# Patient Record
Sex: FEMALE | Race: BLACK OR AFRICAN AMERICAN | Employment: UNEMPLOYED | URBAN - METROPOLITAN AREA
[De-identification: names, ages, dates, MRNs, and addresses within clinical notes are randomized per-mention and may not be internally consistent; named-entity substitution may affect disease eponyms.]

---

## 2020-05-13 ENCOUNTER — TELEPHONE (OUTPATIENT)
Dept: INTERNAL MEDICINE CLINIC | Age: 45
End: 2020-05-13

## 2020-05-13 NOTE — TELEPHONE ENCOUNTER
----- Message from Terri Rodgers sent at 2020  3:00 PM EDT -----  Regarding: Dr. Tahira Oliver  Appointment not available    : 75      Caller's first and last name and relationship to patient (if not the patient): n/a  Best contact [phone]  Preferred date and time: Virtual visit as soon as possible  Scheduled appointment date and time: n/a  Reason for appointment: NP est pcp;   Details to clarify the request: Pt says Dr Karissa Pineda came highly recommended.

## 2020-05-14 NOTE — TELEPHONE ENCOUNTER
Reached out to patient to assist w/ scheduling a New Patient appointment.  Patient has been scheduled for 05/21 @ 10:20 - thank you

## 2020-05-21 ENCOUNTER — OFFICE VISIT (OUTPATIENT)
Dept: INTERNAL MEDICINE CLINIC | Age: 45
End: 2020-05-21

## 2020-05-21 VITALS
DIASTOLIC BLOOD PRESSURE: 80 MMHG | HEIGHT: 67 IN | TEMPERATURE: 98.8 F | HEART RATE: 74 BPM | SYSTOLIC BLOOD PRESSURE: 113 MMHG | BODY MASS INDEX: 19.59 KG/M2 | WEIGHT: 124.8 LBS | OXYGEN SATURATION: 99 % | RESPIRATION RATE: 12 BRPM

## 2020-05-21 DIAGNOSIS — A04.8 H. PYLORI INFECTION: ICD-10-CM

## 2020-05-21 DIAGNOSIS — R63.0 DECREASED APPETITE: Primary | ICD-10-CM

## 2020-05-21 DIAGNOSIS — R00.2 PALPITATIONS: ICD-10-CM

## 2020-05-21 DIAGNOSIS — F41.9 ANXIETY: ICD-10-CM

## 2020-05-21 PROBLEM — R74.8 ELEVATED LIVER ENZYMES: Status: ACTIVE | Noted: 2019-01-01

## 2020-05-21 PROBLEM — E28.319 EARLY MENOPAUSE: Status: ACTIVE | Noted: 2019-03-01

## 2020-05-21 RX ORDER — BUSPIRONE HYDROCHLORIDE 7.5 MG/1
7.5 TABLET ORAL 2 TIMES DAILY
COMMUNITY
End: 2020-08-20

## 2020-05-21 RX ORDER — OMEPRAZOLE 40 MG/1
40 CAPSULE, DELAYED RELEASE ORAL DAILY
Qty: 30 CAP | Refills: 0 | Status: SHIPPED | OUTPATIENT
Start: 2020-05-21 | End: 2020-06-20

## 2020-05-21 RX ORDER — SERTRALINE HYDROCHLORIDE 100 MG/1
TABLET, FILM COATED ORAL DAILY
COMMUNITY
End: 2020-09-24
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
## 2020-06-20 DIAGNOSIS — A04.8 H. PYLORI INFECTION: ICD-10-CM

## 2020-06-20 RX ORDER — OMEPRAZOLE 40 MG/1
CAPSULE, DELAYED RELEASE ORAL
Qty: 30 CAP | Refills: 0 | Status: SHIPPED | OUTPATIENT
Start: 2020-06-20 | End: 2020-07-20

## 2020-08-18 ENCOUNTER — APPOINTMENT (OUTPATIENT)
Dept: CT IMAGING | Age: 45
End: 2020-08-18
Attending: EMERGENCY MEDICINE
Payer: COMMERCIAL

## 2020-08-18 ENCOUNTER — HOSPITAL ENCOUNTER (EMERGENCY)
Age: 45
Discharge: HOME | End: 2020-08-19
Attending: EMERGENCY MEDICINE
Payer: COMMERCIAL

## 2020-08-18 ENCOUNTER — APPOINTMENT (OUTPATIENT)
Dept: GENERAL RADIOLOGY | Age: 45
End: 2020-08-18
Attending: EMERGENCY MEDICINE
Payer: COMMERCIAL

## 2020-08-18 DIAGNOSIS — R07.9 CHEST PAIN, UNSPECIFIED TYPE: Primary | ICD-10-CM

## 2020-08-18 LAB
ALBUMIN SERPL-MCNC: 4 G/DL (ref 3.5–5)
ALBUMIN/GLOB SERPL: 1.1 {RATIO} (ref 1.1–2.2)
ALP SERPL-CCNC: 92 U/L (ref 45–117)
ALT SERPL-CCNC: 52 U/L (ref 12–78)
ANION GAP SERPL CALC-SCNC: 9 MMOL/L (ref 5–15)
AST SERPL-CCNC: 21 U/L (ref 15–37)
BASOPHILS # BLD: 0.1 K/UL (ref 0–0.1)
BASOPHILS NFR BLD: 1 % (ref 0–1)
BILIRUB SERPL-MCNC: 0.3 MG/DL (ref 0.2–1)
BUN SERPL-MCNC: 13 MG/DL (ref 6–20)
BUN/CREAT SERPL: 14 (ref 12–20)
CALCIUM SERPL-MCNC: 9 MG/DL (ref 8.5–10.1)
CHLORIDE SERPL-SCNC: 106 MMOL/L (ref 97–108)
CO2 SERPL-SCNC: 25 MMOL/L (ref 21–32)
COMMENT, HOLDF: NORMAL
CREAT SERPL-MCNC: 0.96 MG/DL (ref 0.55–1.02)
D DIMER PPP FEU-MCNC: 1.07 MG/L FEU (ref 0–0.65)
DIFFERENTIAL METHOD BLD: NORMAL
EOSINOPHIL # BLD: 0.1 K/UL (ref 0–0.4)
EOSINOPHIL NFR BLD: 3 % (ref 0–7)
ERYTHROCYTE [DISTWIDTH] IN BLOOD BY AUTOMATED COUNT: 13 % (ref 11.5–14.5)
GLOBULIN SER CALC-MCNC: 3.5 G/DL (ref 2–4)
GLUCOSE SERPL-MCNC: 155 MG/DL (ref 65–100)
HCG UR QL: NEGATIVE
HCT VFR BLD AUTO: 35.3 % (ref 35–47)
HGB BLD-MCNC: 11.7 G/DL (ref 11.5–16)
IMM GRANULOCYTES # BLD AUTO: 0 K/UL (ref 0–0.04)
IMM GRANULOCYTES NFR BLD AUTO: 0 % (ref 0–0.5)
LYMPHOCYTES # BLD: 1.7 K/UL (ref 0.8–3.5)
LYMPHOCYTES NFR BLD: 34 % (ref 12–49)
MCH RBC QN AUTO: 29.8 PG (ref 26–34)
MCHC RBC AUTO-ENTMCNC: 33.1 G/DL (ref 30–36.5)
MCV RBC AUTO: 89.8 FL (ref 80–99)
MONOCYTES # BLD: 0.4 K/UL (ref 0–1)
MONOCYTES NFR BLD: 7 % (ref 5–13)
NEUTS SEG # BLD: 2.7 K/UL (ref 1.8–8)
NEUTS SEG NFR BLD: 55 % (ref 32–75)
NRBC # BLD: 0 K/UL (ref 0–0.01)
NRBC BLD-RTO: 0 PER 100 WBC
PLATELET # BLD AUTO: 209 K/UL (ref 150–400)
PMV BLD AUTO: 9.5 FL (ref 8.9–12.9)
POTASSIUM SERPL-SCNC: 3.2 MMOL/L (ref 3.5–5.1)
PROT SERPL-MCNC: 7.5 G/DL (ref 6.4–8.2)
RBC # BLD AUTO: 3.93 M/UL (ref 3.8–5.2)
SAMPLES BEING HELD,HOLD: NORMAL
SODIUM SERPL-SCNC: 140 MMOL/L (ref 136–145)
TROPONIN I SERPL-MCNC: <0.05 NG/ML
WBC # BLD AUTO: 4.9 K/UL (ref 3.6–11)

## 2020-08-18 PROCEDURE — 96374 THER/PROPH/DIAG INJ IV PUSH: CPT

## 2020-08-18 PROCEDURE — 36415 COLL VENOUS BLD VENIPUNCTURE: CPT

## 2020-08-18 PROCEDURE — 74011636320 HC RX REV CODE- 636/320: Performed by: RADIOLOGY

## 2020-08-18 PROCEDURE — 81025 URINE PREGNANCY TEST: CPT

## 2020-08-18 PROCEDURE — 85025 COMPLETE CBC W/AUTO DIFF WBC: CPT

## 2020-08-18 PROCEDURE — 71046 X-RAY EXAM CHEST 2 VIEWS: CPT

## 2020-08-18 PROCEDURE — 99284 EMERGENCY DEPT VISIT MOD MDM: CPT

## 2020-08-18 PROCEDURE — 85379 FIBRIN DEGRADATION QUANT: CPT

## 2020-08-18 PROCEDURE — 74011250636 HC RX REV CODE- 250/636: Performed by: EMERGENCY MEDICINE

## 2020-08-18 PROCEDURE — 71275 CT ANGIOGRAPHY CHEST: CPT

## 2020-08-18 PROCEDURE — 80053 COMPREHEN METABOLIC PANEL: CPT

## 2020-08-18 PROCEDURE — 84484 ASSAY OF TROPONIN QUANT: CPT

## 2020-08-18 PROCEDURE — 93005 ELECTROCARDIOGRAM TRACING: CPT

## 2020-08-18 RX ORDER — LORAZEPAM 2 MG/ML
1 INJECTION INTRAMUSCULAR ONCE
Status: COMPLETED | OUTPATIENT
Start: 2020-08-18 | End: 2020-08-18

## 2020-08-18 RX ADMIN — IOPAMIDOL 70 ML: 755 INJECTION, SOLUTION INTRAVENOUS at 23:17

## 2020-08-18 RX ADMIN — LORAZEPAM 1 MG: 2 INJECTION INTRAMUSCULAR; INTRAVENOUS at 22:52

## 2020-08-19 VITALS
TEMPERATURE: 98.9 F | SYSTOLIC BLOOD PRESSURE: 97 MMHG | HEART RATE: 67 BPM | HEIGHT: 67 IN | OXYGEN SATURATION: 100 % | RESPIRATION RATE: 16 BRPM | BODY MASS INDEX: 19.15 KG/M2 | WEIGHT: 122 LBS | DIASTOLIC BLOOD PRESSURE: 67 MMHG

## 2020-08-19 LAB
ATRIAL RATE: 92 BPM
CALCULATED P AXIS, ECG09: 77 DEGREES
CALCULATED R AXIS, ECG10: 78 DEGREES
CALCULATED T AXIS, ECG11: 60 DEGREES
DIAGNOSIS, 93000: NORMAL
P-R INTERVAL, ECG05: 178 MS
Q-T INTERVAL, ECG07: 366 MS
QRS DURATION, ECG06: 76 MS
QTC CALCULATION (BEZET), ECG08: 452 MS
VENTRICULAR RATE, ECG03: 92 BPM

## 2020-08-19 NOTE — ED NOTES
I have reviewed discharge instructions with the patient. The patient verbalized understanding. Patient ambulated out of the ED in stable condition with a steady gait.

## 2020-08-19 NOTE — ED TRIAGE NOTES
Pt refereed by Anda for elevated D-Dimer. c/o intermittent left sided chest pain that started today. Denies SOB, v/d. Reports some nausea. Denies taking any medications for pain.

## 2020-08-19 NOTE — ED PROVIDER NOTES
This is a 28-year-old female with a history of anxiety, depression and GERD that presents with a chief complaint of chest pain. She reports that she was driving her car earlier this evening when she developed some left-sided, sharp/burning chest pain. The pain was nonradiating. It was not associated with any shortness of breath but was associated with nausea. She denies any abdominal pain, GI or urinary symptoms. She has no personal history of DVT/PE; although, her mother has had a PE in the past.           Past Medical History:   Diagnosis Date    Anxiety     Dr. Erika Watson. started zoloft 2020, buspar 2020. .  consulted MD in [facility] and took xanax prn    Depression     Dr. Tobias Dick Early menopause 2019    age 37. dx in [place] Elevated liver enzymes 2019    resolved. consulted GI . US normal    H. pylori infection     dx by stool test.  Dr. Srinivas Stover. failed prevpak, then did not tolerate Pylera.  HSV (herpes simplex virus) infection     dx with hsv-1, hsv-2    Palpitations     benign tachycardia.  consulted EP/card in [place]  (echo normal) and [place] 2020 (48 hour holter ok)        Past Surgical History:   Procedure Laterality Date    HX  SECTION      HX OVARIAN CYST REMOVAL      has one remaining ovary         Family History:   Problem Relation Age of Onset    Hypertension Mother     Pulmonary Embolism Mother     Thyroid Disease Mother     High Cholesterol Mother     Schizophrenia Maternal Aunt     Colon Cancer Maternal Aunt     Heart Attack Maternal Grandmother        Social History     Socioeconomic History    Marital status: SINGLE     Spouse name: 0    Number of children: 1    Years of education: Not on file    Highest education level: Not on file   Occupational History    Not on file   Social Needs    Financial resource strain: Not on file    Food insecurity     Worry: Not on file     Inability: Not on file   [employer] needs Medical: Not on file     Non-medical: Not on file   Tobacco Use    Smoking status: Never Smoker    Smokeless tobacco: Never Used   Substance and Sexual Activity    Alcohol use: Never     Frequency: Never    Drug use: Never    Sexual activity: Not on file   Lifestyle    Physical activity     Days per week: Not on file     Minutes per session: Not on file    Stress: Not on file   Relationships    Social connections     Talks on phone: Not on file     Gets together: Not on file     Attends Jain service: Not on file     Active member of club or organization: Not on file     Attends meetings of clubs or organizations: Not on file     Relationship status: Not on file    Intimate partner violence     Fear of current or ex partner: Not on file     Emotionally abused: Not on file     Physically abused: Not on file     Forced sexual activity: Not on file   Other Topics Concern    Not on file   Social History Narrative    11 year -old son 5/2020         ALLERGIES: Patient has no known allergies. Review of Systems   Constitutional: Negative for fever. HENT: Negative for rhinorrhea. Respiratory: Negative for shortness of breath. Cardiovascular: Positive for chest pain. Gastrointestinal: Positive for nausea. Negative for abdominal pain. Genitourinary: Negative for dysuria. Musculoskeletal: Negative for back pain. Skin: Negative for wound. Neurological: Negative for headaches. Psychiatric/Behavioral: Negative for confusion. Vitals:    08/18/20 2105   BP: 113/76   Pulse: 93   Resp: 16   Temp: 99 °F (37.2 °C)   SpO2: 100%   Weight: 55.3 kg (122 lb)   Height: 5' 7\" (1.702 m)            Physical Exam  Vitals signs and nursing note reviewed. Constitutional:       General: She is not in acute distress. Appearance: Normal appearance. She is well-developed. She is not ill-appearing, toxic-appearing or diaphoretic. HENT:      Head: Normocephalic and atraumatic.    Eyes:      Extraocular Movements: Extraocular movements intact. Neck:      Musculoskeletal: Normal range of motion. Cardiovascular:      Rate and Rhythm: Normal rate and regular rhythm. Pulses: Normal pulses. Heart sounds: Normal heart sounds. Pulmonary:      Effort: Pulmonary effort is normal. No respiratory distress. Breath sounds: Normal breath sounds. No wheezing. Abdominal:      General: There is no distension. Musculoskeletal: Normal range of motion. Skin:     General: Skin is warm and dry. Neurological:      Mental Status: She is alert and oriented to person, place, and time. Psychiatric:         Mood and Affect: Mood normal.          MDM  Number of Diagnoses or Management Options  Diagnosis management comments: Patient seen evaluated by myself. She presents with chest pain. Differentials include but not limited to pulmonary embolism, pneumonia, ACS, GERD among others. Laboratory studies show elevated d-dimer. CT of the chest will be obtained to rule out PE.  EKG shows normal sinus rhythm at a rate of 92, normal intervals, normal axis, no evidence of active ischemia. The remainder of her laboratory studies are unremarkable. 11:00 PM  Change of shift. Care of patient signed over to Dr. Candi Husain. Bedside handoff complete. Awaiting CTA. ED Course as of Nov 04 0812   Tue Aug 18, 2020   8688 11:06 PM  Change of shift. Care of patient taken over from Dr. Erika Crews; H&P reviewed, bedside handoff complete. Awaiting CTA chest      [ZD]   9991 IMPRESSION  IMPRESSION: No evidence for pulmonary embolism.    CTA CHEST W OR W WO CONT [ZD]      ED Course User Index  [ZD] Aaron Ramírez MD       Procedures

## 2020-08-20 ENCOUNTER — OFFICE VISIT (OUTPATIENT)
Dept: INTERNAL MEDICINE CLINIC | Age: 45
End: 2020-08-20
Payer: COMMERCIAL

## 2020-08-20 VITALS
SYSTOLIC BLOOD PRESSURE: 108 MMHG | DIASTOLIC BLOOD PRESSURE: 71 MMHG | WEIGHT: 122.2 LBS | BODY MASS INDEX: 19.18 KG/M2 | HEIGHT: 67 IN | TEMPERATURE: 99 F | HEART RATE: 74 BPM | RESPIRATION RATE: 12 BRPM | OXYGEN SATURATION: 100 %

## 2020-08-20 DIAGNOSIS — R79.89 ABNORMAL LFTS: ICD-10-CM

## 2020-08-20 DIAGNOSIS — Z12.9 CANCER SCREENING: ICD-10-CM

## 2020-08-20 DIAGNOSIS — Z00.00 ROUTINE ADULT HEALTH MAINTENANCE: ICD-10-CM

## 2020-08-20 DIAGNOSIS — R63.4 WEIGHT LOSS: ICD-10-CM

## 2020-08-20 DIAGNOSIS — R79.89 POSITIVE D DIMER: Primary | ICD-10-CM

## 2020-08-20 PROCEDURE — 99214 OFFICE O/P EST MOD 30 MIN: CPT | Performed by: INTERNAL MEDICINE

## 2020-08-20 RX ORDER — BUSPIRONE HYDROCHLORIDE 15 MG/1
15 TABLET ORAL 2 TIMES DAILY
COMMUNITY
Start: 2020-05-28 | End: 2020-11-27 | Stop reason: SDUPTHER

## 2020-08-20 RX ORDER — LORAZEPAM 0.5 MG/1
TABLET ORAL
Qty: 1 TAB | Refills: 0 | Status: SHIPPED | OUTPATIENT
Start: 2020-08-20 | End: 2021-04-02 | Stop reason: ALTCHOICE

## 2020-08-20 NOTE — PROGRESS NOTES
Assessment and Plan   Diagnoses and all orders for this visit:    1. Positive D dimer  -     PERIPHERAL SMEAR; Future  -     D DIMER; Future  -     PROTHROMBIN TIME + INR; Future  -     PTT; Future  -     SED RATE (ESR); Future  -     C REACTIVE PROTEIN, QT; Future  2. Weight loss  -     TSH 3RD GENERATION; Future  -     T4, FREE; Future  3. Abnormal LFTs  -     HEPATITIS C AB; Future  -     HBV CORE AB, IGG/IGM; Future  -     HEP B SURFACE AG; Future  -     HEP B SURFACE AB; Future  -     FERRITIN; Future  -     IRON PROFILE; Future  -     ALPHA-1-ANTITRYPSIN, TOTAL; Future  -     METABOLIC PANEL, COMPREHENSIVE; Future  4. Cancer screening  -     CT ABD PELV W WO CONT; Future  -     LORazepam (ATIVAN) 0.5 mg tablet; Take 1 tablet 30 min prior to procedure by mouth once  5. Routine adult health maintenance  -     LIPID PANEL; Future  -     HEMOGLOBIN A1C WITH EAG; Future  Presents to clinic for evaluation of a left chest sharp sensation who was noted to have a positive d-dimer in the emergency room and a negative CTA chest associated with weight loss and a slightly abnormal ALT on labs. Unclear etiology of symptoms and positive d-dimer. Had her colonoscopy and EGD 2 months ago so I do not think that would affect her d-dimer. Colonoscopy negative for cancer. Due for Pap smear, advised to make an appointment to get that done. We will get labs to further evaluate and decide next steps. Benefits, risks, possible drug interactions, and side effects of all new medications were reviewed with the patient. Pt verbalized understanding. Return to clinic: Pending work-up    Washington Malcolm MD  [facility]  8/20/2020    No future appointments. Subjective   Chief Complaint   ER follow-up    Louis Burnham is a 39 y.o. female     Was seen in the emergency room August 18 for left-sided chest \"sensation\" that feels like a \"kick start. \"  Also characterized as warm.   Reports that she was driving home from work when she experienced 4 episodes that lasted for a few seconds of a sharp sensation in her left chest not associated with lightheadedness, dizziness, shortness of breath. Her troponin and EKG were normal but her d-dimer was elevated. CT a chest was negative for PE. She returns to clinic today concerned about her elevated d-dimer. She has not had any of the chest sensation since her ER visit. Reports that she has been losing weight without really trying although she does report her depression got worse and she was not eating as much them. However, she feels she is eating a lot now and still not gaining any weight. She is concerned that there is something more serious going on. Has gone through menopause. Rheumatology review of systems significant for dry mouth, occasionally feeling like her food gets stuck when swallowing. Denies any joint pain or swelling, oral ulcers, dry eyes, rashes, skin tightening. Denies fever, chills, night sweats. Review of systems otherwise positive for left arm numbness from her elbow down that she thought was because she slept on her arm. Will also occasionally have itching in her  Toes and fingers. Of note, she had a EGD and colonoscopy in June that showed esophagitis and she is on a PPI. Review of Systems   Constitutional: Negative for chills and fever. Respiratory: Negative for shortness of breath. Cardiovascular: Positive for chest pain. Objective   Vitals:       Visit Vitals  /71 (BP 1 Location: Left arm, BP Patient Position: Sitting)   Pulse 74   Temp 99 °F (37.2 °C) (Oral)   Resp 12   Ht 5' 7\" (1.702 m)   Wt 122 lb 3.2 oz (55.4 kg)   SpO2 100%   BMI 19.14 kg/m²        Physical Exam  Constitutional:       General: She is not in acute distress. Appearance: She is well-developed.    HENT:      Right Ear: Tympanic membrane, ear canal and external ear normal.      Left Ear: Tympanic membrane, ear canal and external ear normal.   Eyes:      Extraocular Movements: Extraocular movements intact. Conjunctiva/sclera: Conjunctivae normal.   Neck:      Musculoskeletal: Neck supple. Cardiovascular:      Rate and Rhythm: Normal rate and regular rhythm. Pulses: Normal pulses. Heart sounds: No murmur. No friction rub. No gallop. Pulmonary:      Effort: No respiratory distress. Breath sounds: No wheezing or rales. Abdominal:      General: Bowel sounds are normal. There is no distension. Palpations: Abdomen is soft. There is no hepatomegaly, splenomegaly or mass. Tenderness: There is no abdominal tenderness. Hernia: No hernia is present. Skin:     General: Skin is warm. Findings: No rash. Neurological:      Mental Status: She is alert. Gait: Gait normal.   Psychiatric:         Mood and Affect: Mood normal.         Thought Content: Thought content normal.         Judgment: Judgment normal.          Current Outpatient Medications   Medication Sig    busPIRone (BUSPAR) 15 mg tablet Take 15 mg by mouth two (2) times a day.  LORazepam (ATIVAN) 0.5 mg tablet Take 1 tablet 30 min prior to procedure by mouth once    omeprazole (PRILOSEC) 40 mg capsule TAKE 1 CAPSULE BY MOUTH EVERY DAY    sertraline (Zoloft) 100 mg tablet Take  by mouth daily. No current facility-administered medications for this visit.         Voice recognition software is utilized and this note may contain transcription errors

## 2020-09-01 DIAGNOSIS — Z12.9 CANCER SCREENING: ICD-10-CM

## 2020-09-01 DIAGNOSIS — R63.4 WEIGHT LOSS: Primary | ICD-10-CM

## 2020-09-01 DIAGNOSIS — R79.89 ABNORMAL LFTS: ICD-10-CM

## 2020-09-01 DIAGNOSIS — R79.89 POSITIVE D DIMER: ICD-10-CM

## 2020-09-02 ENCOUNTER — TELEPHONE (OUTPATIENT)
Dept: INTERNAL MEDICINE CLINIC | Age: 45
End: 2020-09-02

## 2020-09-02 DIAGNOSIS — R63.4 WEIGHT LOSS: Primary | ICD-10-CM

## 2020-09-02 DIAGNOSIS — R79.89 ABNORMAL LFTS: ICD-10-CM

## 2020-09-02 DIAGNOSIS — R79.89 POSITIVE D DIMER: ICD-10-CM

## 2020-09-02 NOTE — TELEPHONE ENCOUNTER
Spoke to pts insurance and updated the prior authorization to reflect CPT code co CT abd/pel with contrast. Brionna, rep with [employer], states the PA is under final medical director review.

## 2020-09-02 NOTE — TELEPHONE ENCOUNTER
David Chery from [address] [place] [address] would like a return call regarding this pt.     [phone]    Thank you

## 2020-09-02 NOTE — TELEPHONE ENCOUNTER
Returned call to  [employer], the call was from [place]. She states the order needs to be CT abd/pel with contrast per radiologist and is requesting the order be faxed to her at [phone]. I advised her that a previous message was taken to reorder the CT without contrast and that's how the auth was submitted so a new auth will have to be submitted.  The information about approval will be sent to her when we receive it but per insurance will take 24-48 hrs to review

## 2020-09-03 LAB
ALBUMIN SERPL-MCNC: 4.2 G/DL (ref 3.5–5)
ALBUMIN/GLOB SERPL: 1.2 {RATIO} (ref 1.1–2.2)
ALP SERPL-CCNC: 93 U/L (ref 45–117)
ALT SERPL-CCNC: 60 U/L (ref 12–78)
ANION GAP SERPL CALC-SCNC: 6 MMOL/L (ref 5–15)
APTT PPP: 26.2 SEC (ref 22.1–32)
AST SERPL-CCNC: 29 U/L (ref 15–37)
BILIRUB SERPL-MCNC: 0.3 MG/DL (ref 0.2–1)
BUN SERPL-MCNC: 12 MG/DL (ref 6–20)
BUN/CREAT SERPL: 13 (ref 12–20)
CALCIUM SERPL-MCNC: 9.7 MG/DL (ref 8.5–10.1)
CHLORIDE SERPL-SCNC: 106 MMOL/L (ref 97–108)
CHOLEST SERPL-MCNC: 201 MG/DL
CO2 SERPL-SCNC: 27 MMOL/L (ref 21–32)
CREAT SERPL-MCNC: 0.89 MG/DL (ref 0.55–1.02)
CRP SERPL-MCNC: <0.29 MG/DL (ref 0–0.6)
D DIMER PPP FEU-MCNC: 1.39 MG/L FEU (ref 0–0.65)
ERYTHROCYTE [SEDIMENTATION RATE] IN BLOOD: 14 MM/HR (ref 0–20)
EST. AVERAGE GLUCOSE BLD GHB EST-MCNC: 111 MG/DL
FERRITIN SERPL-MCNC: 94 NG/ML (ref 8–252)
GLOBULIN SER CALC-MCNC: 3.4 G/DL (ref 2–4)
GLUCOSE SERPL-MCNC: 81 MG/DL (ref 65–100)
HBA1C MFR BLD: 5.5 % (ref 4–5.6)
HDLC SERPL-MCNC: 61 MG/DL
HDLC SERPL: 3.3 {RATIO} (ref 0–5)
INR PPP: 1.1 (ref 0.9–1.1)
LDLC SERPL CALC-MCNC: 123.8 MG/DL (ref 0–100)
LIPID PROFILE,FLP: ABNORMAL
POTASSIUM SERPL-SCNC: 4.2 MMOL/L (ref 3.5–5.1)
PROT SERPL-MCNC: 7.6 G/DL (ref 6.4–8.2)
PROTHROMBIN TIME: 11.1 SEC (ref 9–11.1)
SODIUM SERPL-SCNC: 139 MMOL/L (ref 136–145)
T4 FREE SERPL-MCNC: 1.1 NG/DL (ref 0.8–1.5)
THERAPEUTIC RANGE,PTTT: NORMAL SECS (ref 58–77)
TRIGL SERPL-MCNC: 81 MG/DL (ref ?–150)
TSH SERPL DL<=0.05 MIU/L-ACNC: 1.13 UIU/ML (ref 0.36–3.74)
VLDLC SERPL CALC-MCNC: 16.2 MG/DL

## 2020-09-04 LAB
COMMENT, HOLDF: NORMAL
HBV SURFACE AB SER QL: REACTIVE
HBV SURFACE AB SER-ACNC: 33.43 MIU/ML
HBV SURFACE AG SER QL: <0.1 INDEX
HBV SURFACE AG SER QL: NEGATIVE
HCV AB SERPL QL IA: NONREACTIVE
HCV COMMENT,HCGAC: NORMAL
IRON SATN MFR SERPL: 21 % (ref 20–50)
IRON SERPL-MCNC: 62 UG/DL (ref 35–150)
PERIPHERAL SMEAR,PSM: NORMAL
SAMPLES BEING HELD,HOLD: NORMAL
TIBC SERPL-MCNC: 299 UG/DL (ref 250–450)

## 2020-09-05 LAB
A1AT SERPL-MCNC: 135 MG/DL (ref 101–187)
HBV CORE AB SERPL QL IA: NEGATIVE
HBV CORE IGM SERPL QL IA: NEGATIVE

## 2020-09-07 NOTE — PROGRESS NOTES
Addendum   09/07/20   . Immune to hepatitis B.  D-dimer still elevated.   ALT still slightly elevated  Labs otherwise normal  CT abd/pelvis pending   if all negative, heme-onc consult  Smear pretty much negative

## 2020-09-16 ENCOUNTER — TELEPHONE (OUTPATIENT)
Dept: INTERNAL MEDICINE CLINIC | Age: 45
End: 2020-09-16

## 2020-09-16 NOTE — TELEPHONE ENCOUNTER
Bryant Sutton with [facility] called following up on a authorization needed for patient. Please call Bryant Sutton to advise.      [phone]     Appointment is scheduled for 9.17.2020 early AM

## 2020-09-16 NOTE — TELEPHONE ENCOUNTER
Spoke to [employer] and provided info:  Elodia Locke # [id number], exp 10/31/20  Ref # [id number]  Member [phone]    Peer to peer # [phone]

## 2020-09-16 NOTE — TELEPHONE ENCOUNTER
----- Message from Bella Barron sent at 9/16/2020  3:47 PM EDT -----  Regarding:  [facility] / Bianca Lea first and last name: Di Lai  Reason for call: [facility] needs an authorization tonight in order for the Pt to be prepared for 8am procedure tomorrow morning. Callback required yes/no and why: [phone] Joy Jarrell  Best contact number(s): FAX Number - [phone]  Details to clarify the request:  CT of ABD @ 8am in the morning.

## 2020-09-16 NOTE — TELEPHONE ENCOUNTER
Bella Sanchez at [address] will be in the office until [address] [place] [address].  You can call and ask for her to advise of the determination at [phone]

## 2020-09-16 NOTE — TELEPHONE ENCOUNTER
Pts insurance is  denying the CT abd/pel with contrast (requested it to be ordered this way per radiologist at [address] [place] [address]). Insurance number is [id number] to  Complete Peer to Peer. Pt is scheduled early morning tomorrow.  Please advise of outcome of Peer to Peer so I can let the patient and [address] [place] [address] know

## 2020-09-17 ENCOUNTER — TELEPHONE (OUTPATIENT)
Dept: INTERNAL MEDICINE CLINIC | Age: 45
End: 2020-09-17

## 2020-09-17 NOTE — TELEPHONE ENCOUNTER
Returned call to pt. She is requesting the results of the CT scan. I advised her that we have the results and waiting for Dr Tracie Allan to review it.  She voices understanding and thanks

## 2020-09-17 NOTE — TELEPHONE ENCOUNTER
----- Message from Christysamsonsallie Cuevas sent at 9/17/2020  1:19 PM EDT -----  Regarding: Dr. Darron Amin first and last name: self  Reason for call: Pt is requesting a return call from Dr. Adry Delgado regarding her CT scan today. Pt thought she was to have a CT with contrast and a CT without contrast, however only a CT with contrast was performed. Callback required yes/no and why: Yes  Best contact number(s): [phone]  Details to clarify the request: Pt had blood work and the \"D-diamer\" count was up higher than it should be. Pt is having some anxiety related to her current situation and wants to get to the bottom of her issues. Pt also states today's CT report should be forthcoming today and is very anxious to hear results. Pt wants to hear back today as she off work today.

## 2020-09-24 ENCOUNTER — OFFICE VISIT (OUTPATIENT)
Dept: INTERNAL MEDICINE CLINIC | Age: 45
End: 2020-09-24
Payer: COMMERCIAL

## 2020-09-24 VITALS
DIASTOLIC BLOOD PRESSURE: 74 MMHG | WEIGHT: 120.8 LBS | HEIGHT: 67 IN | BODY MASS INDEX: 18.96 KG/M2 | RESPIRATION RATE: 16 BRPM | TEMPERATURE: 98.7 F | SYSTOLIC BLOOD PRESSURE: 113 MMHG | OXYGEN SATURATION: 100 % | HEART RATE: 91 BPM

## 2020-09-24 DIAGNOSIS — K20.0 EOSINOPHILIC ESOPHAGITIS: ICD-10-CM

## 2020-09-24 DIAGNOSIS — Z12.31 SCREENING MAMMOGRAM, ENCOUNTER FOR: ICD-10-CM

## 2020-09-24 DIAGNOSIS — R63.4 WEIGHT LOSS: Primary | ICD-10-CM

## 2020-09-24 DIAGNOSIS — R79.89 D-DIMER, ELEVATED: ICD-10-CM

## 2020-09-24 DIAGNOSIS — M26.609 TMJ (TEMPOROMANDIBULAR JOINT SYNDROME): ICD-10-CM

## 2020-09-24 DIAGNOSIS — F41.9 ANXIETY: ICD-10-CM

## 2020-09-24 PROCEDURE — 99214 OFFICE O/P EST MOD 30 MIN: CPT | Performed by: INTERNAL MEDICINE

## 2020-09-24 RX ORDER — SERTRALINE HYDROCHLORIDE 100 MG/1
150 TABLET, FILM COATED ORAL DAILY
Qty: 45 TAB | Refills: 2 | Status: SHIPPED | OUTPATIENT
Start: 2020-09-24 | End: 2020-11-27 | Stop reason: SDUPTHER

## 2020-09-24 RX ORDER — BISMUTH SUBSALICYLATE 262 MG
1 TABLET,CHEWABLE ORAL DAILY
COMMUNITY
End: 2021-04-01 | Stop reason: ALTCHOICE

## 2020-09-24 NOTE — PROGRESS NOTES
HISTORY OF PRESENT ILLNESS    Chief Complaint   Patient presents with   [facility]       Presents for follow-up    She is concerned about ongoing weight loss. Wt Readings from Last 3 Encounters:   09/24/20 120 lb 12.8 oz (54.8 kg)   08/20/20 122 lb 3.2 oz (55.4 kg)   08/18/20 122 lb (55.3 kg)     Was seen by my partner on August 20 for concerns about elevated d-dimer that was discovered in the emergency room on August 18. Patient was seen in the emergency room for complaints of chest pain. She did have a CTA that was negative for pulmonary embolism. Also was found to have mildly elevated ALT. CT of the abdomen pelvis were also done which showed a small uterine mass. She saw GYN Dr. Jazmine Yeager 9/22/20. US done secondary to an ultrasound October 2019. Was deemed to have a stable fibroid measuring 11 x 8 x 14 mm. Right ovary appears normal.  Left ovary appears normal.  Endometrium normal.  Her last Pap was December 2017. Note states that next Pap is due December 2021. Gynecology deemed that she does not likely have a gynecologic cause of weight loss. Colonoscopy and EGD done were done this summer. Was found to have possible eosinophilic esophagitis of borderline significance. Was started on PPI and asked to follow-up if symptoms were not improving. Extensive additional blood work was done on September 3 which showed normal CBC, d-dimer mildly elevated, normal thyroid function, no inflammation on ESR and CRP testing, no evidence of viral hepatitis, normal iron, normal alpha-1 antitrypsin, normal lipid panel, A1c    She is seen by counselor and psychiatry for anxiety and depression. She is taking sertraline 100 mg daily, buspirone 15 mg twice daily and lorazepam sparingly. Feels that anxiety is not controlled. Has a lot of worry about her health and her future. Moved from [place] because of stress. Working as a dental assistant. She is grinding her teeth.     Review of Systems   All other systems reviewed and are negative, except as noted in HPI    Past Medical and Surgical History   has a past medical history of Anxiety (), Depression (), Early menopause (2019), Elevated liver enzymes (), H. pylori infection (), HSV (herpes simplex virus) infection, and Palpitations (). has a past surgical history that includes hx  section (); hx ovarian cyst removal; hx colonoscopy (2020); and hx endoscopy (2020). reports that she has never smoked. She has never used smokeless tobacco. She reports that she does not drink alcohol or use drugs. family history includes Colon Cancer in her maternal aunt; Heart Attack in her maternal grandmother; High Cholesterol in her mother; Hypertension in her mother; Pulmonary Embolism in her mother; Schizophrenia in her maternal aunt; Thyroid Disease in her mother. Physical Exam   Nursing note and vitals reviewed. Blood pressure 113/74, pulse 91, temperature 98.7 °F (37.1 °C), temperature source Oral, resp. rate 16, height 5' 7\" (1.702 m), weight 120 lb 12.8 oz (54.8 kg), SpO2 100 %. Constitutional:  No distress. Eyes: Conjunctivae are normal.   Ears:  Hearing grossly intact  Cardiovascular: Normal rate. regular rhythm, no murmurs or gallops  No edema  Pulmonary/Chest: Effort normal.   CTAB  Musculoskeletal: moves all 4 extremities   Neurological: Alert and oriented to person, place, and time. Skin: No rash noted. Psychiatric: Normal mood and affect. Behavior is normal.     ASSESSMENT and PLAN  Diagnoses and all orders for this visit:    1. Weight loss  Reassured that she has had an extensive work-up including GYN, GI, imaging. I recommend mammogram.  Lab work is showed no evidence of malignancy or any other issue. The only issue we have found has been eosinophilic esophagitis which may contribute. Recommend following up with GI about this. Anxiety is likely contributing.     2. D-dimer, elevated  CTA was negative for embolism. She has no edema of either leg or pain. Reassured that the d-dimer level is likely not clinically relevant. She still remains very concerned. I offered to order a duplex of her lower extremities for the only remaining possibility of issues which I think would be a subclinical thrombosis of her legs. If this is normal, I do not recommend any further testing for d-dimer.  -     DUPLEX LOWER EXT VENOUS BILAT; Future    3. Eosinophilic esophagitis  Completed PPI. Follow-up with GI. Perhaps additional therapy would be helpful? 4. Anxiety uncontrolled anxiety and worry. Seeing counselor and psychiatry. I recommend increasing Zoloft and following up with both. This is likely causing her weight loss. Consider mirtazapine. -     sertraline (Zoloft) 100 mg tablet; Take 1.5 Tabs by mouth daily. 5. TMJ (temporomandibular joint syndrome)  She is grinding her teeth. She will have a dental guard created. Suggested that this is a symptom of her underlying anxiety which needs to be better controlled. Offered my support. 6. Screening mammogram, encounter for  -     [facility] MAMMO BI SCREENING INCL CAD; Future      lab results and schedule of future lab studies reviewed with patient  reviewed medications and side effects in detail    Return to clinic for further evaluation if new symptoms develop        Current Outpatient Medications   Medication Sig    multivitamin (ONE A DAY) tablet Take 1 Tab by mouth daily.  sertraline (Zoloft) 100 mg tablet Take 1.5 Tabs by mouth daily.  busPIRone (BUSPAR) 15 mg tablet Take 15 mg by mouth two (2) times a day.  omeprazole (PRILOSEC) 40 mg capsule TAKE 1 CAPSULE BY MOUTH EVERY DAY    LORazepam (ATIVAN) 0.5 mg tablet Take 1 tablet 30 min prior to procedure by mouth once     No current facility-administered medications for this visit.

## 2020-09-24 NOTE — PROGRESS NOTES
Chief Complaint   Patient presents with    Labs     3 most recent [facility] Screens 9/24/2020   Little interest or pleasure in doing things Several days   Feeling down, depressed, irritable, or hopeless Several days   Total Score PHQ 2 2     Abuse Screening Questionnaire 9/24/2020   Do you ever feel afraid of your partner? N   Are you in a relationship with someone who physically or mentally threatens you? N   Is it safe for you to go home? Y     Visit Vitals  /74 (BP 1 Location: Left arm, BP Patient Position: Sitting)   Pulse 91   Temp 98.7 °F (37.1 °C) (Oral)   Resp 16   Ht 5' 7\" (1.702 m)   Wt 120 lb 12.8 oz (54.8 kg)   SpO2 100%   BMI 18.92 kg/m²     1. Have you been to the ER, urgent care clinic since your last visit? Hospitalized since your last visit?no    2. Have you seen or consulted any other health care providers outside of the [address] [place] [address] since your last visit? Include any pap smears or colon screening.  GI

## 2020-10-01 ENCOUNTER — HOSPITAL ENCOUNTER (OUTPATIENT)
Dept: VASCULAR SURGERY | Age: 45
Discharge: HOME | End: 2020-10-01
Attending: INTERNAL MEDICINE
Payer: COMMERCIAL

## 2020-10-01 ENCOUNTER — HOSPITAL ENCOUNTER (OUTPATIENT)
Dept: MAMMOGRAPHY | Age: 45
Discharge: HOME | End: 2020-10-01
Attending: INTERNAL MEDICINE
Payer: COMMERCIAL

## 2020-10-01 DIAGNOSIS — Z12.31 SCREENING MAMMOGRAM, ENCOUNTER FOR: ICD-10-CM

## 2020-10-01 DIAGNOSIS — R79.89 D-DIMER, ELEVATED: ICD-10-CM

## 2020-10-01 PROCEDURE — 77063 BREAST TOMOSYNTHESIS BI: CPT

## 2020-10-01 PROCEDURE — 93970 EXTREMITY STUDY: CPT

## 2020-10-05 ENCOUNTER — TELEPHONE (OUTPATIENT)
Dept: INTERNAL MEDICINE CLINIC | Age: 45
End: 2020-10-05

## 2020-10-05 NOTE — TELEPHONE ENCOUNTER
----- Message from Mamadou Steven MD sent at 10/4/2020 10:41 PM EDT -----  Please let her know that her ultrasound of the legs showed no sign of clots.  No further testing needed in working up her d-dimer

## 2020-10-05 NOTE — TELEPHONE ENCOUNTER
Spoke to pt She has been advised of results and recommendations.  She voices understanding and thanks

## 2020-10-05 NOTE — PROGRESS NOTES
Please let her know that her ultrasound of the legs showed no sign of clots.  No further testing needed in working up her d-dimer

## 2020-11-24 DIAGNOSIS — R63.4 WEIGHT LOSS: ICD-10-CM

## 2020-11-24 DIAGNOSIS — R79.89 POSITIVE D DIMER: ICD-10-CM

## 2020-11-24 DIAGNOSIS — R79.89 ABNORMAL LFTS: ICD-10-CM

## 2020-11-24 PROCEDURE — 74177 CT ABD & PELVIS W/CONTRAST: CPT | Performed by: INTERNAL MEDICINE

## 2020-11-27 ENCOUNTER — TELEPHONE (OUTPATIENT)
Dept: INTERNAL MEDICINE CLINIC | Age: 45
End: 2020-11-27

## 2020-11-27 DIAGNOSIS — F41.9 ANXIETY: ICD-10-CM

## 2020-11-27 RX ORDER — BUSPIRONE HYDROCHLORIDE 15 MG/1
15 TABLET ORAL 2 TIMES DAILY
Qty: 60 TAB | Refills: 2 | Status: SHIPPED | OUTPATIENT
Start: 2020-11-27 | End: 2021-02-26

## 2020-11-27 RX ORDER — SERTRALINE HYDROCHLORIDE 100 MG/1
150 TABLET, FILM COATED ORAL DAILY
Qty: 45 TAB | Refills: 2 | Status: SHIPPED | OUTPATIENT
Start: 2020-11-27 | End: 2021-04-01

## 2020-11-27 NOTE — TELEPHONE ENCOUNTER
----- Message from [address] [place] sent at 11/25/2020  3:30 PM EST -----  Regarding: Dr. Taty Arredondo (if not patient): N/A      Relationship of caller (if not patient): N/A      Best contact number(s): [phone]      Name of medication and dosage if known: sertraline (Zoloft) 100 mg and busPIRone (BUSPAR) 15 mg      Is patient out of this medication (yes/no): No to both; Buspar rx has about 4 tab left      Pharmacy name: [facility] at [address] [place] [address] listed in chart? (yes/no): No  Pharmacy phone number: [phone]      Details to clarify the request: Pt requesting refills for Zoloft 100 mg and Buspar 15 mg rx be sent to new [facility] pharmacy on [address]. Requesting if medications may be filled with refills.        Yuriy Giron

## 2021-02-26 RX ORDER — BUSPIRONE HYDROCHLORIDE 15 MG/1
TABLET ORAL
Qty: 60 TAB | Refills: 2 | Status: SHIPPED | OUTPATIENT
Start: 2021-02-26 | End: 2021-04-01

## 2021-04-01 ENCOUNTER — OFFICE VISIT (OUTPATIENT)
Dept: INTERNAL MEDICINE CLINIC | Age: 46
End: 2021-04-01
Payer: COMMERCIAL

## 2021-04-01 VITALS
HEIGHT: 67 IN | DIASTOLIC BLOOD PRESSURE: 70 MMHG | WEIGHT: 128.8 LBS | HEART RATE: 72 BPM | RESPIRATION RATE: 14 BRPM | SYSTOLIC BLOOD PRESSURE: 105 MMHG | BODY MASS INDEX: 20.21 KG/M2 | OXYGEN SATURATION: 99 % | TEMPERATURE: 98.6 F

## 2021-04-01 DIAGNOSIS — M26.609 TMJ (TEMPOROMANDIBULAR JOINT DISORDER): ICD-10-CM

## 2021-04-01 DIAGNOSIS — K22.2 ESOPHAGEAL STRICTURE: ICD-10-CM

## 2021-04-01 DIAGNOSIS — R79.89 ELEVATED D-DIMER: ICD-10-CM

## 2021-04-01 DIAGNOSIS — R74.8 ELEVATED LIVER ENZYMES: ICD-10-CM

## 2021-04-01 DIAGNOSIS — F41.9 ANXIETY: Primary | ICD-10-CM

## 2021-04-01 DIAGNOSIS — R23.8 ABNORMAL SKIN COLOR: ICD-10-CM

## 2021-04-01 PROCEDURE — 99213 OFFICE O/P EST LOW 20 MIN: CPT | Performed by: INTERNAL MEDICINE

## 2021-04-01 RX ORDER — BUSPIRONE HYDROCHLORIDE 15 MG/1
TABLET ORAL
Qty: 60 TAB | Refills: 3 | Status: SHIPPED | OUTPATIENT
Start: 2021-04-01 | End: 2021-09-04

## 2021-04-01 RX ORDER — SERTRALINE HYDROCHLORIDE 100 MG/1
200 TABLET, FILM COATED ORAL DAILY
Qty: 60 TAB | Refills: 3 | Status: SHIPPED | OUTPATIENT
Start: 2021-04-01 | End: 2021-04-06

## 2021-04-01 RX ORDER — FLUTICASONE PROPIONATE 50 MCG
2 SPRAY, SUSPENSION (ML) NASAL AS NEEDED
COMMUNITY

## 2021-04-01 NOTE — PROGRESS NOTES
HISTORY OF PRESENT ILLNESS    Chief Complaint   Patient presents with    Labs     Nonfasting.  Anxiety     f/u    Medication Evaluation       Presents for follow-up    GERD and esophageal stricture. She saw GI doctor Demario in February and states she had an esophageal dilation which has helped. She is not taking any PPIs at this point. Denies any significant dysphagia or reflux symptoms. Anxiety, depression  Is not seeing Dr. Sharon Don due to cost.  She is taking sertraline 150 mg daily, buspirone 15 mg twice daily   Feels that she needs just a touch more medication because she is significantly worried a lot of the time. 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 She is concerned about hypopigmented spots of her abdomen and about \"chicken skin\" of her legs. Gained weight intentionally  Wt Readings from Last 3 Encounters:   21 128 lb 12.8 oz (58.4 kg)   20 120 lb 12.8 oz (54.8 kg)   20 122 lb 3.2 oz (55.4 kg)     Review of Systems   All other systems reviewed and are negative, except as noted in HPI    Past Medical and Surgical History   has a past medical history of Anxiety (), Depression (), Early menopause (2019), Elevated liver enzymes (), H. pylori infection (), HSV (herpes simplex virus) infection, and Palpitations (). has a past surgical history that includes hx  section (); hx ovarian cyst removal; hx colonoscopy (2020); hx endoscopy (2020); and ir esophagoscopy w/balloon (2021). reports that she has never smoked. She has never used smokeless tobacco. She reports that she does not drink alcohol or use drugs. family history includes Colon Cancer in her maternal aunt; Heart Attack in her maternal grandmother; High Cholesterol in her mother; Hypertension in her mother; Pulmonary Embolism in her mother; Schizophrenia in her maternal aunt; Thyroid Disease in her mother.     Physical Exam   Nursing note and vitals reviewed. Blood pressure 105/70, pulse 72, temperature 98.6 °F (37 °C), temperature source Oral, resp. rate 14, height 5' 7\" (1.702 m), weight 128 lb 12.8 oz (58.4 kg), SpO2 99 %. Constitutional:  No distress. Eyes: Conjunctivae are normal.   Ears:  Hearing grossly intact  Cardiovascular: Normal rate. regular rhythm, no murmurs or gallops  No edema  Pulmonary/Chest: Effort normal.   CTAB  Musculoskeletal: moves all 4 extremities   Neurological: Alert and oriented to person, place, and time. Skin:   Mild hypopigmented regions of her abdomen. Bilateral legs are mildly hypopigmented with retained darker pigmentation of all hair follicles. Psychiatric: Normal mood and affect. Behavior is normal.     ASSESSMENT and PLAN  Diagnoses and all orders for this visit:    1. Anxiety  Ongoing significant anxiety. Will increase Zoloft to 200 mg and continue BuSpar. Could consider increasing buspirone dose as another option.  -     busPIRone (BUSPAR) 15 mg tablet; TAKE 1 TABLET BY MOUTH TWICE A DAY  -     sertraline (Zoloft) 100 mg tablet; Take 2 Tabs by mouth daily. 2. TMJ (temporomandibular joint disorder)  Stretching exercises demonstrated for for this condition  Could consider Flexeril 5 mg at night. She will let me know. 3. Elevated liver enzymes  History of mildly up from her liver enzymes. Lab Results   Component Value Date/Time    ALT (SGPT) 60 09/03/2020 02:30 PM    AST (SGOT) 29 09/03/2020 02:30 PM    Alk. phosphatase 93 09/03/2020 02:30 PM    Bilirubin, total 0.3 09/03/2020 02:30 PM     4. Esophageal stricture  Symptomatically improved status post dilation. 5. Elevated d-dimer  We discussed her mildly elevated D-dimer test which occurred last year. Complete work-up was done which showed no clear etiology we could certainly repeat this test, but unclear exactly what we are monitoring for. This is likely benign. She agrees not to repeat it.     6. Abnormal skin color Unclear etiology. Does not appear blotchy like I would expect tinea. May perhaps have some mild autoimmune vitiligo? Recommend dermatology evaluation.  -     REFERRAL TO DERMATOLOGY    7.  Keratosis pilaris of legs. Recommend topical emollients. Topical retinoids may be used. Refer to dermatology for evaluation. There are no Patient Instructions on file for this visit.    lab results and schedule of future lab studies reviewed with patient  reviewed medications and side effects in detail    Return to clinic for further evaluation if new symptoms develop        Current Outpatient Medications   Medication Sig    fluticasone propionate (Flonase Allergy Relief) 50 mcg/actuation nasal spray 2 Sprays by Both Nostrils route daily.  busPIRone (BUSPAR) 15 mg tablet TAKE 1 TABLET BY MOUTH TWICE A DAY    sertraline (Zoloft) 100 mg tablet Take 1.5 Tabs by mouth daily.  LORazepam (ATIVAN) 0.5 mg tablet Take 1 tablet 30 min prior to procedure by mouth once    multivitamin (ONE A DAY) tablet Take 1 Tab by mouth daily.  omeprazole (PRILOSEC) 40 mg capsule TAKE 1 CAPSULE BY MOUTH EVERY DAY     No current facility-administered medications for this visit.

## 2021-04-02 PROBLEM — L85.8 KERATOSIS PILARIS: Status: ACTIVE | Noted: 2021-01-01

## 2021-04-06 DIAGNOSIS — F41.9 ANXIETY: ICD-10-CM

## 2021-04-06 RX ORDER — SERTRALINE HYDROCHLORIDE 100 MG/1
TABLET, FILM COATED ORAL
Qty: 45 TAB | Refills: 2 | Status: SHIPPED | OUTPATIENT
Start: 2021-04-06 | End: 2021-09-04

## 2021-05-17 ENCOUNTER — OFFICE VISIT (OUTPATIENT)
Dept: HEMATOLOGY | Age: 46
End: 2021-05-17

## 2021-05-17 VITALS
OXYGEN SATURATION: 99 % | TEMPERATURE: 96.7 F | DIASTOLIC BLOOD PRESSURE: 73 MMHG | WEIGHT: 131.4 LBS | RESPIRATION RATE: 20 BRPM | HEART RATE: 64 BPM | SYSTOLIC BLOOD PRESSURE: 113 MMHG | BODY MASS INDEX: 20.62 KG/M2 | HEIGHT: 67 IN

## 2021-05-17 DIAGNOSIS — R74.8 ELEVATED LIVER ENZYMES: Primary | ICD-10-CM

## 2021-05-17 PROBLEM — R00.2 PALPITATIONS: Status: RESOLVED | Noted: 2019-01-01 | Resolved: 2021-05-17

## 2021-05-17 PROCEDURE — 91200 LIVER ELASTOGRAPHY: CPT | Performed by: NURSE PRACTITIONER

## 2021-05-17 PROCEDURE — 99202 OFFICE O/P NEW SF 15 MIN: CPT | Performed by: NURSE PRACTITIONER

## 2021-05-17 RX ORDER — VALACYCLOVIR HYDROCHLORIDE 500 MG/1
TABLET, FILM COATED ORAL AS NEEDED
COMMUNITY
Start: 2021-04-15

## 2021-05-17 NOTE — PROGRESS NOTES
[address] [facility] MD Carlos Enrique, Albert Collazo, Des Tang MD, MPH      Fe Harvey, PA-NAHUM Kyle, [facility]     Elaina Mccallum, [facility]   Poli Knapp, FNP-C    Shameka Christiansen, [facility]       Randall Knox [facility] 136    at [address]    [address] [place] [address], [facility] Alexis Boyce  22.    [phone]    FAX: [address] [place] [address] Avenue    [address], [address], [address]    [phone]    FAX: [phone]       Patient Care Team:  Buck Fortune MD as PCP - General (Internal Medicine)  Buck Fortune MD as PCP - [facility] Provider  Terrell Kussmaul, MD (Gastroenterology)      Problem List  Date Reviewed: 9/25/2020          Codes Class Noted    Keratosis pilaris ICD-10-CM: L85.8  ICD-9-CM: 757.39  2021    Overview Signed 4/2/2021  4:10 PM by Buck Fortune MD     legs             Esophageal stricture ICD-10-CM: K22.2  ICD-9-CM: 530.3  Unknown    Overview Signed 4/1/2021  1:26 PM by Buck Fortune MD     s/p eophageal dilation 2/2021 Dr. Ileana Arce. pylori infection ICD-10-CM: A04.8  ICD-9-CM: 041.86  2020    Overview Signed 5/21/2020 10:56 AM by Buck Fortune MD     dx by stool test.  Dr. Judd Marti. failed prevpak, then did not tolerate Pylera. HSV (herpes simplex virus) infection ICD-10-CM: B00.9  ICD-9-CM: 054.9  Unknown    Overview Signed 5/21/2020 10:53 AM by Buck Fortune MD     dx with hsv-1, hsv-2             Elevated liver enzymes ICD-10-CM: R74.8  ICD-9-CM: 790.5  2019    Overview Signed 5/21/2020 10:56 AM by Buck Fortune MD     resolved. consulted GI             Anxiety ICD-10-CM: F41.9  ICD-9-CM: 300.00  2019    Overview Signed 5/21/2020 10:59 AM by Buck Fotrune MD     consulted MD in [facility].  started lexapro/xanax.   then saw  Angel Barron             Early menopause ICD-10-CM: E28.319  ICD-9-CM: 256.31  3/2019    Overview Signed 5/21/2020 10:55 AM by Silviano Andrews MD     age 37. dx in [place]                 The physicians listed above have asked me to see Leonie Cheng in consultation regarding elevated liver enzymes and to perform assessment of fibrosis by means of in-office fibroscan analysis. The patient is a 39 y.o. Black female who was found to have elevated liver enzymes in 2020. The patient has the following symptoms which could be attributed to the liver disorder:  pain in the right side over the liver, and nausea. The patient is not experiencing the following symptoms which are commonly seen in this liver disorder: fatigue, yellowing of the eyes or skin, itching, or swelling of the abdomen. The patient completes all daily activities without any functional limitations. ASSESSMENT AND PLAN:  Elevated liver enzymes of uncertain etiology with no fibrosis. Assessment of liver fibrosis was performed with Fibroscan in the office today. The result was 4.0 kPa which correlates with no fibrosis. The CAP score of 246 suggests no hepatic steatosis. It is possible the patient had a non-specific virus or the elevation could be related to OTC supplements. A copy of the report was provided to the patient and will be forwarded to the referring medical practice. All questions were answered. The patient expressed a clear understanding of the above. ALLERGIES:  No Known Allergies    MEDICATIONS:  Current Outpatient Medications   Medication Sig    sertraline (ZOLOFT) 100 mg tablet TAKE 1 AND 1/2 TABLETS BY MOUTH DAILY    fluticasone propionate (Flonase Allergy Relief) 50 mcg/actuation nasal spray 2 Sprays by Both Nostrils route daily.     busPIRone (BUSPAR) 15 mg tablet TAKE 1 TABLET BY MOUTH TWICE A DAY    valACYclovir (VALTREX) 500 mg tablet TAKE 1 TABLET BY MOUTH TWICE A DAY FOR 3 DAYS     No current facility-administered medications for this visit. SYSTEM REVIEW NOT RELATED TO LIVER DISEASE OR REVIEWED ABOVE:  Constitution systems: Negative for fever, chills, weight gain, weight loss. Eyes: Negative for visual changes. ENT: Negative for sore throat, painful swallowing. Respiratory: Negative for cough, hemoptysis, SOB. Cardiology: Negative for chest pain, palpitations. GI:  Negative for constipation or diarrhea. : Negative for urinary frequency, dysuria, hematuria, nocturia. Skin: Negative for rash. Hematology: Negative for easy bruising, blood clots. Musculo-skelatal: Negative for back pain, muscle pain, weakness. Neurologic: Negative for headaches, dizziness, vertigo, memory problems not related to HE. Psychology: Negative for anxiety, depression. FAMILY HISTORY:  The father is alive and well. The mother has/had the following chronic disease(s): History of PE, HTN, Hypothyroid, Dyslipidemia. There is no family history of liver disease. Anttha Bigness is no family history of immune disorders. SOCIAL HISTORY:  The patient is . The patient has 1 child. The patient has never used tobacco products. The patient does not drink. The patient currently works full-time as dental assistant. PHYSICAL EXAMINATION:  Visit Vitals  /73 (BP 1 Location: Right upper arm, BP Patient Position: Sitting, BP Cuff Size: Adult)   Pulse 64   Temp (!) 96.7 °F (35.9 °C) (Temporal)   Resp 20   Ht 5' 7\" (1.702 m)   Wt 131 lb 6.4 oz (59.6 kg)   SpO2 99%   BMI 20.58 kg/m²     General: No acute distress. Skin: No spider angiomata. No jaundice. No palmar erythema. Abdomen: Soft non-tender. Liver size normal to percussion/palpation. Spleen not palpable. No obvious ascites. Extremities: No edema. No muscle wasting. No gross arthritic changes. Neurologic: Alert and oriented. Cranial nerves grossly intact. No asterixis.     LABORATORY STUDIES:  From: 3/11/2021  AST/ALT/ALP/T Bili/ALB: 29/42/80/0.2/4.3    Liver [place] of [address] [place] [address] Units 9/3/2020 8/18/2020   INR 0.9 - 1.1   1.1    AST 15 - 37 U/L 29 21   ALT 12 - 78 U/L 60 52   Alk Phos 45 - 117 U/L 93 92   Bili, Total 0.2 - 1.0 MG/DL 0.3 0.3   Albumin 3.5 - 5.0 g/dL 4.2 4.0   BUN 6 - 20 MG/DL 12 13   Creat 0.55 - 1.02 MG/DL 0.89 0.96   Na 136 - 145 mmol/L 139 140   K 3.5 - 5.1 mmol/L 4.2 3.2 (L)   Cl 97 - 108 mmol/L 106 106   CO2 21 - 32 mmol/L 27 25   Glucose 65 - 100 mg/dL 81 155 (H)     SEROLOGIES:  Not available or performed. Testing will be performed as indicated. LIVER HISTOLOGY:  5/2021. FibroScan performed at [address]. EkPa was 4.0. IQR/med 23%. . The results suggested a fibrosis level of F0. The CAP score suggests there is no significant hepatic steatosis. ENDOSCOPIC PROCEDURES:  Not available or performed    RADIOLOGY:  Not available or performed    OTHER TESTING:  Not available or performed    FOLLOW-UP:  All of the issues listed above in the Assessment and Plan were discussed with the patient. All questions were answered. The patient expressed a clear understanding of the above. The patient will follow-up with the referring physician. KWABENA Chappell-[facility] of [address] [address] [address], [address] [facility], [facility] 22.  [address] [facility]

## 2021-05-17 NOTE — PROGRESS NOTES
Identified pt with two pt identifiers(name and ). Reviewed record in preparation for visit and have obtained necessary documentation. No chief complaint on file. Vitals:    21 1140   BP: 113/73   Pulse: 64   Resp: 20   Temp: (!) 96.7 °F (35.9 °C)   TempSrc: Temporal   SpO2: 99%   Weight: 131 lb 6.4 oz (59.6 kg)   Height: 5' 7\" (1.702 m)   PainSc:   0 - No pain       Health Maintenance Review: Patient reminded of \"due or due soon\" health maintenance. I have asked the patient to contact his/her primary care provider (PCP) for follow-up on his/her health maintenance. Coordination of Care Questionnaire:  :   1) Have you been to an emergency room, urgent care, or hospitalized since your last visit? If yes, where when, and reason for visit? no       2. Have seen or consulted any other health care provider since your last visit? If yes, where when, and reason for visit? YES      Patient is accompanied by self I have received verbal consent from [facility] to discuss any/all medical information while they are present in the room.

## 2021-08-12 ENCOUNTER — OFFICE VISIT (OUTPATIENT)
Dept: INTERNAL MEDICINE CLINIC | Age: 46
End: 2021-08-12
Payer: COMMERCIAL

## 2021-08-12 VITALS
DIASTOLIC BLOOD PRESSURE: 79 MMHG | WEIGHT: 139 LBS | HEIGHT: 67 IN | HEART RATE: 69 BPM | RESPIRATION RATE: 12 BRPM | BODY MASS INDEX: 21.82 KG/M2 | OXYGEN SATURATION: 98 % | SYSTOLIC BLOOD PRESSURE: 116 MMHG

## 2021-08-12 DIAGNOSIS — E28.319 EARLY MENOPAUSE: ICD-10-CM

## 2021-08-12 DIAGNOSIS — R35.0 FREQUENT URINATION: Primary | ICD-10-CM

## 2021-08-12 DIAGNOSIS — R74.8 ELEVATED LIVER ENZYMES: ICD-10-CM

## 2021-08-12 DIAGNOSIS — R20.2 PARESTHESIA OF BOTH FEET: ICD-10-CM

## 2021-08-12 LAB
BILIRUB UR QL STRIP: NEGATIVE
GLUCOSE UR-MCNC: NEGATIVE MG/DL
KETONES P FAST UR STRIP-MCNC: NEGATIVE MG/DL
PH UR STRIP: 5.5 [PH] (ref 4.6–8)
PROT UR QL STRIP: NEGATIVE
SP GR UR STRIP: 1.02 (ref 1–1.03)
UA UROBILINOGEN AMB POC: NORMAL (ref 0.2–1)
URINALYSIS CLARITY POC: CLEAR
URINALYSIS COLOR POC: YELLOW
URINE BLOOD POC: NEGATIVE
URINE LEUKOCYTES POC: NEGATIVE
URINE NITRITES POC: NEGATIVE

## 2021-08-12 PROCEDURE — 81001 URINALYSIS AUTO W/SCOPE: CPT | Performed by: NURSE PRACTITIONER

## 2021-08-12 PROCEDURE — 99214 OFFICE O/P EST MOD 30 MIN: CPT | Performed by: NURSE PRACTITIONER

## 2021-08-12 NOTE — PROGRESS NOTES
Maureen Berrios is a 55 y.o. female    Chief Complaint   Patient presents with    Foot Pain     with burning bilateral for months now     Urinary Frequency     for months     Eye Problem     \"blurry/hazy vision\"        Health Maintenance Due   Topic Date Due    COVID-19 Vaccine (1) Never done    DTaP/Tdap/Td series (1 - Tdap) Never done    PAP AKA CERVICAL CYTOLOGY  12/06/2020        Visit Vitals  /79 (BP 1 Location: Right upper arm, BP Patient Position: Sitting)   Pulse 69   Resp 12   Ht 5' 7\" (1.702 m)   Wt 139 lb (63 kg)   SpO2 98%   BMI 21.77 kg/m²       3 most recent PHQ Screens 8/12/2021   Little interest or pleasure in doing things Not at all   Feeling down, depressed, irritable, or hopeless Not at all   Total Score PHQ 2 0       Fall Risk Assessment, last 12 mths 5/21/2020   Able to walk? Yes   Fall in past 12 months? No       Abuse Screening Questionnaire 9/24/2020   Do you ever feel afraid of your partner? N   Are you in a relationship with someone who physically or mentally threatens you? N   Is it safe for you to go home? Y         1. Have you been to the ER, urgent care clinic since your last visit? Hospitalized since your last visit?no    2. Have you seen or consulted any other health care providers outside of the [address] [place] [address] since your last visit? Include any pap smears or colon screening. yes cardiologist and gastro

## 2021-08-12 NOTE — PROGRESS NOTES
Luz Blue (: 1975) is a 55 y.o. female, established patient, here for evaluation of the following chief complaint(s): Foot Pain (with burning bilateral for months now ), Urinary Frequency (for months ), and Eye Problem (\"blurry/hazy vision\" )       ASSESSMENT/PLAN:  Below is the assessment and plan developed based on review of pertinent history, physical exam, labs, studies, and medications. 1. Frequent urination -- urine dip in office normal; describes sense of incomplete emptying that has occurred several times in the past few months. Denies dribbling or urgency. -     AMB POC URINALYSIS DIP STICK AUTO W/ MICRO  -     CBC WITH AUTOMATED DIFF; Future  -     METABOLIC PANEL, COMPREHENSIVE; Future  -     HEMOGLOBIN A1C WITH EAG; Future    2. Paresthesia of both feet -- transient symptoms that occur randomly 2-3 times per week and last for minutes; no signs of weakness noted. Will check lytes, vitamin B12 and folate  -     CBC WITH AUTOMATED DIFF; Future  -     METABOLIC PANEL, COMPREHENSIVE; Future  -     TSH 3RD GENERATION; Future  -     T4, FREE; Future  -     VITAMIN B12 & FOLATE; Future  -     MAGNESIUM; Future    3. Early menopause  -     ESTRADIOL; Future  -     FSH AND LH; Future    4. Elevated liver enzymes -- has had extensive GI workup and Fibroscan performed at [place] [address] was negative with no significant hepatic steatosis. -     METABOLIC PANEL, COMPREHENSIVE; Future      Follow up with Dr Paty Mercer once lab results return. SUBJECTIVE/OBJECTIVE:  HPI    Patient of Dr Paty Mercer who presents with constellation of various symptoms. Complains of urinary frequency with sense of incomplete emptying that has occurred several times over the past 3-4 months. Denies urinary burning or pain; denies urgency or dribbling; denies hematuria. Admits that she does not tend to drink enough water but has been working on that.     Has noted \"burning\" sensation to soles of both feet that has been occurring 2-3 times per week for the past several months and will last for 1-2 minutes before subsiding. Has not noted any change in skin color, temperature. Can occur when standing, sitting, walking and resting. Denies weakness in legs or feet. Reports she was told that she was in menopause but has not had any hormone levels drawn. Has not had a menstrual cycle since her son was born 10 years ago. Reports history of elevated liver enzymes without any clear etiology; has had an extensive workup with GI and had negative Fibroscan in May 2021. Patient Active Problem List   Diagnosis Code    HSV (herpes simplex virus) infection B00.9    Early menopause E28.319    Elevated liver enzymes R74.8    H. pylori infection A04.8    Anxiety F41.9    Esophageal stricture K22.2    Keratosis pilaris L85.8     Past Surgical History:   Procedure Laterality Date    HX  SECTION      HX COLONOSCOPY  2020    normal. Dr. Seb Moran (in chart)    HX ENDOSCOPY  2020    mild distal esophagitis. Dr. Seb Moran. moderate eosphinphils. tx 8 weeks PPI.  neg. H pylori    HX ENDOSCOPY  2021    esophageal dilation.   Dr. Chelsea Ponce      has one remaining ovary    IR ESOPHAGOSCOPY W/BALLOON  2021     Social History     Socioeconomic History    Marital status: SINGLE     Spouse name: 0    Number of children: 1    Years of education: Not on file    Highest education level: Not on file   Occupational History    Not on file   Tobacco Use    Smoking status: Never Smoker    Smokeless tobacco: Never Used   Substance and Sexual Activity    Alcohol use: Never    Drug use: Never    Sexual activity: Not on file   Other Topics Concern    Not on file   Social History Narrative    11 year -old son 2020     Social Determinants of Health     Financial Resource Strain:     Difficulty of Paying Living Expenses:    Food Insecurity:     Worried About Running Out of Food in the Last Year:     Ran Out of Food in the Last Year:    Transportation Needs:     Lack of Transportation (Medical):  Lack of Transportation (Non-Medical):    Physical Activity:     Days of Exercise per Week:     Minutes of Exercise per Session:    Stress:     Feeling of Stress :    Social Connections:     Frequency of Communication with Friends and Family:     Frequency of Social Gatherings with Friends and Family:     Attends [employer] Services:     Active Member of Clubs or Organizations:     Attends Club or Organization Meetings:     Marital Status:    Intimate Partner Violence:     Fear of Current or Ex-Partner:     Emotionally Abused:     Physically Abused:     Sexually Abused:      Family History   Problem Relation Age of Onset    Hypertension Mother     Pulmonary Embolism Mother     Thyroid Disease Mother     High Cholesterol Mother     Schizophrenia Maternal Aunt     Colon Cancer Maternal Aunt     Heart Attack Maternal Grandmother      Current Outpatient Medications   Medication Sig    valACYclovir (VALTREX) 500 mg tablet as needed.  sertraline (ZOLOFT) 100 mg tablet TAKE 1 AND 1/2 TABLETS BY MOUTH DAILY    fluticasone propionate (Flonase Allergy Relief) 50 mcg/actuation nasal spray 2 Sprays by Both Nostrils route as needed.  busPIRone (BUSPAR) 15 mg tablet TAKE 1 TABLET BY MOUTH TWICE A DAY     No current facility-administered medications for this visit. No Known Allergies    There is no immunization history on file for this patient. Review of Systems   Constitutional: Positive for fatigue. Negative for chills, fever and unexpected weight change. HENT: Negative for congestion, postnasal drip and sore throat. Respiratory: Negative for cough and shortness of breath. Cardiovascular: Negative for chest pain. Gastrointestinal: Positive for abdominal pain. Negative for constipation, diarrhea and nausea. Genitourinary: Positive for frequency.  Negative for dysuria and urgency. Musculoskeletal: Negative for myalgias. Neurological: Positive for numbness. Negative for tremors, speech difficulty and weakness. Psychiatric/Behavioral: The patient is nervous/anxious. /79 (BP 1 Location: Right upper arm, BP Patient Position: Sitting)   Pulse 69   Resp 12   Ht 5' 7\" (1.702 m)   Wt 139 lb (63 kg)   SpO2 98%   BMI 21.77 kg/m²   Physical Exam  Vitals and nursing note reviewed. Constitutional:       General: She is not in acute distress. Appearance: Normal appearance. She is normal weight. HENT:      Head: Normocephalic and atraumatic. Cardiovascular:      Rate and Rhythm: Normal rate and regular rhythm. Pulses: Normal pulses. Heart sounds: Normal heart sounds. Pulmonary:      Effort: Pulmonary effort is normal.      Breath sounds: Normal breath sounds. Abdominal:      General: Bowel sounds are normal.      Palpations: Abdomen is soft. Tenderness: There is abdominal tenderness (mild epigastric tenderness). Musculoskeletal:         General: Normal range of motion. Skin:     General: Skin is warm and dry. Findings: No rash. Neurological:      General: No focal deficit present. Mental Status: She is alert and oriented to person, place, and time. Psychiatric:         Mood and Affect: Mood is anxious. Behavior: Behavior normal.           On this date 08/12/2021 I have spent 35 minutes reviewing previous notes, test results and face to face with the patient discussing the diagnosis and importance of compliance with the treatment plan as well as documenting on the day of the visit. An electronic signature was used to authenticate this note.   -- Maggie Dutta NP

## 2021-08-12 NOTE — PATIENT INSTRUCTIONS
Numbness and Tingling: Care Instructions  Your Care Instructions     Many things can cause numbness or tingling. Swelling may put pressure on a nerve. This could cause you to lose feeling or have a pins-and-needles sensation on part of your body. Nerves may be damaged from trauma, toxins, or diseases, such as diabetes or multiple sclerosis (MS). Sometimes, though, the cause is not clear. If there is no clear reason for your symptoms, and you are not having any other symptoms, your doctor may suggest watching and waiting for a while to see if the numbness or tingling goes away on its own. Your doctor may want you to have blood or nerve tests to find the cause of your symptoms. Follow-up care is a key part of your treatment and safety. Be sure to make and go to all appointments, and call your doctor if you are having problems. It's also a good idea to know your test results and keep a list of the medicines you take. How can you care for yourself at home? · If your doctor prescribes medicine, take it exactly as directed. Call your doctor if you think you are having a problem with your medicine. · If you have any swelling, put ice or a cold pack on the area for 10 to 20 minutes at a time. Put a thin cloth between the ice and your skin. When should you call for help? Call 911 anytime you think you may need emergency care. For example, call if:    · You have weakness, numbness, or tingling in both legs.     · You lose bowel or bladder control.     · You have symptoms of a stroke. These may include:  ? Sudden numbness, tingling, weakness, or loss of movement in your face, arm, or leg, especially on only one side of your body. ? Sudden vision changes. ? Sudden trouble speaking. ? Sudden confusion or trouble understanding simple statements. ? Sudden problems with walking or balance. ? A sudden, severe headache that is different from past headaches.    Watch closely for changes in your health, and be sure to contact your doctor if you have any problems, or if:    · You do not get better as expected. Where can you learn more? Go to http://www.Dizz[employer].com/  Enter U128 in the search box to learn more about \"Numbness and Tingling: Care Instructions. \"  Current as of: August 4, 2020               Content Version: 12.8  © 2006-2021 [employer]. Care instructions adapted under license by [employer] (which disclaims liability or warranty for this information). If you have questions about a medical condition or this instruction, always ask your healthcare professional. [place] [address] any warranty or liability for your use of this information. Frequent Urination: Care Instructions  Your Care Instructions  An urge to urinate frequently but usually passing only small amounts of urine is a common symptom of urinary problems, such as urinary tract infections. The bladder may become inflamed. This can cause the urge to urinate. You may try to urinate more often than usual to try to soothe that urge. Frequent urination also may be caused by sexually transmitted infections (STIs) or kidney stones. Or it may happen when something irritates the tube that carries urine from the bladder to the outside of the body (urethra). It may also be a sign of diabetes. The cause may be hard to find. You may need tests. Follow-up care is a key part of your treatment and safety. Be sure to make and go to all appointments, and call your doctor if you are having problems. It's also a good idea to know your test results and keep a list of the medicines you take. How can you care for yourself at home? · Drink extra water for the next day or two. This will help make the urine less concentrated.  (If you have kidney, heart, or liver disease and have to limit fluids, talk with your doctor before you increase the amount of fluids you drink.)  · Avoid drinks that are carbonated or have caffeine. They can irritate the bladder. For women:  · Urinate right after you have sex. · After you go to the bathroom, wipe from front to back. · Avoid douches, bubble baths, and feminine hygiene sprays. And avoid other feminine hygiene products that have deodorants. When should you call for help? Call your doctor now or seek immediate medical care if:    · You have new symptoms, such as fever, nausea, or vomiting.     · You have new or worse symptoms of a urinary problem. For example:  ? You have blood or pus in your urine. ? You have chills or body aches. ? It hurts to urinate. ? You have groin or belly pain. ? You have pain in your back just below your rib cage (the flank area). Watch closely for changes in your health, and be sure to contact your doctor if you feel thirstier than usual.  Where can you learn more? Go to http://www.gray.com/  Enter I431 in the search box to learn more about \"Frequent Urination: Care Instructions. \"  Current as of: June 29, 2020               Content Version: 12.8  © 8687-4644 [employer]. Care instructions adapted under license by [employer] (which disclaims liability or warranty for this information). If you have questions about a medical condition or this instruction, always ask your healthcare professional. [place] [address] any warranty or liability for your use of this information. Learning About Menopause  What is menopause? For most women, menopause is a natural process of aging. Menstrual periods gradually stop. The ability to become pregnant ends. Some women feel relief that they no longer have periods. But other women struggle with the physical and emotional changes that come with menopause. For most women, menopause happens around age 48. But every woman's body has its own timeline. Some women stop having periods in their mid-40s.  Others keep having periods well into their 46s. And some women go through menopause early because of cancer treatment or surgery to remove the ovaries. What happens during menopause? · It starts with perimenopause. This is the process of change that leads up to menopause. Perimenopause can start as early as your late 35s or as late as your early 46s. How long it lasts varies. But it usually lasts from 2 to 8 years. · During this time, your hormone levels will go up and down unevenly (fluctuate). This causes changes in your periods and other symptoms. In time, estrogen and progesterone levels drop enough that the menstrual cycle stops. Going a full year without having a period is usually considered menopause. · Low estrogen levels after menopause speed bone loss. This increases your risk of osteoporosis. Also, your risk of heart disease increases after menopause. · It's normal to have thinner, drier skin after menopause. The vaginal lining and the lower urinary tract also thin. This can make it hard to have sex. It can also increase the risk of vaginal and urinary tract infections. What are the symptoms? · Hot flashes. · Trouble sleeping. · Vaginal dryness. Symptoms related to mood and thinking may also happen around the time of menopause. These include:  · Mood swings or feeling grouchy, depressed, or worried. · Problems with remembering or thinking clearly. Some women have only a few mild symptoms. Others have severe symptoms that disrupt their sleep and daily lives. Menopause caused by surgery, chemotherapy, or radiation therapy can cause symptoms to be more severe. A condition you already had, such as depression, anxiety, sleep problems, or irritability, can also make symptoms worse. Symptoms tend to last or get worse the first year or more after menopause. Over time, hormones even out at low levels. Many symptoms improve or go away. But some women may have symptoms that don't go away. After menopause, you may get other symptoms. These include drying and thinning of the skin, and vaginal and urinary tract changes. How are menopause symptoms treated? If your symptoms are bothering you, there are lifestyle changes and treatments that can help. Lifestyle changes    · Choose a heart-healthy diet that is low in saturated fat. It should include plenty of fruits, vegetables, beans, and high-fiber grains and breads. Be sure you get enough calcium and vitamin D to help your bones stay strong. Low-fat or nonfat dairy products are a great source of calcium.     · Get regular exercise. Exercise can help you manage your weight, keep your heart and bones strong, and lift your mood.     · Limit caffeine, alcohol, and stress. These things may make symptoms worse. Limiting them may help you sleep better.     · If you smoke, stop. Quitting smoking can reduce hot flashes and long-term health risks. Medicines  If your symptoms bother you, talk with your doctor. You may want to try prescription medicines, such as:    · Birth control pills before menopause.     · Hormone therapy (HT).   · Antidepressants.     · A medicine called clonidine that is usually used to treat high blood pressure. All medicines for menopause symptoms have possible risks or side effects. A very small number of women develop serious health problems when taking hormone therapy. Be sure to talk to your doctor about your possible health risks before you start a treatment for menopause symptoms. Other treatments  You can try:    · Cognitive-behavorial therapy. This may help reduce hot flashes.     · Hypnosis. This may help reduce the number and severity of hot flashes.     · Breathing exercises. They may help reduce hot flashes and emotional symptoms.     · Soy. Some women feel that eating lots of soy helps even out their menopause symptoms.     · Yoga or biofeedback. They may help reduce stress. Follow-up care is a key part of your treatment and safety.  Be sure to make and go to all appointments, and call your doctor if you are having problems. It's also a good idea to know your test results and keep a list of the medicines you take. Where can you learn more? Go to http://www.gray.com/  Enter H199 in the search box to learn more about \"Learning About Menopause. \"  Current as of: July 17, 2020               Content Version: 12.8  © 2006-2021 [employer]. Care instructions adapted under license by [employer] (which disclaims liability or warranty for this information). If you have questions about a medical condition or this instruction, always ask your healthcare professional. [address] any warranty or liability for your use of this information.

## 2021-08-13 LAB
ALBUMIN SERPL-MCNC: 4.1 G/DL (ref 3.5–5)
ALBUMIN/GLOB SERPL: 1.3 {RATIO} (ref 1.1–2.2)
ALP SERPL-CCNC: 70 U/L (ref 45–117)
ALT SERPL-CCNC: 31 U/L (ref 12–78)
ANION GAP SERPL CALC-SCNC: 3 MMOL/L (ref 5–15)
AST SERPL-CCNC: 19 U/L (ref 15–37)
BASOPHILS # BLD: 0 K/UL (ref 0–0.1)
BASOPHILS NFR BLD: 1 % (ref 0–1)
BILIRUB SERPL-MCNC: 0.3 MG/DL (ref 0.2–1)
BUN SERPL-MCNC: 18 MG/DL (ref 6–20)
BUN/CREAT SERPL: 23 (ref 12–20)
CALCIUM SERPL-MCNC: 9.1 MG/DL (ref 8.5–10.1)
CHLORIDE SERPL-SCNC: 107 MMOL/L (ref 97–108)
CO2 SERPL-SCNC: 30 MMOL/L (ref 21–32)
CREAT SERPL-MCNC: 0.79 MG/DL (ref 0.55–1.02)
DIFFERENTIAL METHOD BLD: ABNORMAL
EOSINOPHIL # BLD: 0.1 K/UL (ref 0–0.4)
EOSINOPHIL NFR BLD: 2 % (ref 0–7)
ERYTHROCYTE [DISTWIDTH] IN BLOOD BY AUTOMATED COUNT: 13.2 % (ref 11.5–14.5)
EST. AVERAGE GLUCOSE BLD GHB EST-MCNC: 114 MG/DL
FOLATE SERPL-MCNC: 31.9 NG/ML (ref 5–21)
FSH SERPL-ACNC: 162.3 MIU/ML
GLOBULIN SER CALC-MCNC: 3.1 G/DL (ref 2–4)
GLUCOSE SERPL-MCNC: 64 MG/DL (ref 65–100)
HBA1C MFR BLD: 5.6 % (ref 4–5.6)
HCT VFR BLD AUTO: 39.3 % (ref 35–47)
HGB BLD-MCNC: 12.3 G/DL (ref 11.5–16)
IMM GRANULOCYTES # BLD AUTO: 0 K/UL (ref 0–0.04)
IMM GRANULOCYTES NFR BLD AUTO: 0 % (ref 0–0.5)
LH SERPL-ACNC: 69.1 MIU/ML
LYMPHOCYTES # BLD: 1.5 K/UL (ref 0.8–3.5)
LYMPHOCYTES NFR BLD: 42 % (ref 12–49)
MAGNESIUM SERPL-MCNC: 2.1 MG/DL (ref 1.6–2.4)
MCH RBC QN AUTO: 29.5 PG (ref 26–34)
MCHC RBC AUTO-ENTMCNC: 31.3 G/DL (ref 30–36.5)
MCV RBC AUTO: 94.2 FL (ref 80–99)
MONOCYTES # BLD: 0.4 K/UL (ref 0–1)
MONOCYTES NFR BLD: 10 % (ref 5–13)
NEUTS SEG # BLD: 1.6 K/UL (ref 1.8–8)
NEUTS SEG NFR BLD: 45 % (ref 32–75)
NRBC # BLD: 0 K/UL (ref 0–0.01)
NRBC BLD-RTO: 0 PER 100 WBC
PLATELET # BLD AUTO: 211 K/UL (ref 150–400)
PMV BLD AUTO: 11.2 FL (ref 8.9–12.9)
POTASSIUM SERPL-SCNC: 3.8 MMOL/L (ref 3.5–5.1)
PROT SERPL-MCNC: 7.2 G/DL (ref 6.4–8.2)
RBC # BLD AUTO: 4.17 M/UL (ref 3.8–5.2)
SODIUM SERPL-SCNC: 140 MMOL/L (ref 136–145)
T4 FREE SERPL-MCNC: 0.8 NG/DL (ref 0.8–1.5)
TSH SERPL DL<=0.05 MIU/L-ACNC: 2.25 UIU/ML (ref 0.36–3.74)
VIT B12 SERPL-MCNC: 1108 PG/ML (ref 193–986)
WBC # BLD AUTO: 3.6 K/UL (ref 3.6–11)

## 2021-08-14 LAB — ESTRADIOL SERPL-MCNC: <5 PG/ML

## 2021-09-04 DIAGNOSIS — F41.9 ANXIETY: ICD-10-CM

## 2021-09-04 RX ORDER — BUSPIRONE HYDROCHLORIDE 15 MG/1
TABLET ORAL
Qty: 60 TABLET | Refills: 3 | Status: SHIPPED | OUTPATIENT
Start: 2021-09-04 | End: 2021-10-25 | Stop reason: SDUPTHER

## 2021-09-04 RX ORDER — SERTRALINE HYDROCHLORIDE 100 MG/1
TABLET, FILM COATED ORAL
Qty: 60 TABLET | Refills: 3 | Status: SHIPPED | OUTPATIENT
Start: 2021-09-04 | End: 2021-10-25 | Stop reason: SDUPTHER

## 2021-09-07 ENCOUNTER — OFFICE VISIT (OUTPATIENT)
Dept: URGENT CARE | Age: 46
End: 2021-09-07
Payer: COMMERCIAL

## 2021-09-07 VITALS — HEART RATE: 60 BPM | OXYGEN SATURATION: 98 % | RESPIRATION RATE: 12 BRPM | TEMPERATURE: 98.5 F

## 2021-09-07 DIAGNOSIS — R19.7 DIARRHEA, UNSPECIFIED TYPE: ICD-10-CM

## 2021-09-07 DIAGNOSIS — Z20.822 ENCOUNTER FOR LABORATORY TESTING FOR COVID-19 VIRUS: Primary | ICD-10-CM

## 2021-09-07 LAB — SARS-COV-2 POC: NEGATIVE

## 2021-09-07 PROCEDURE — 87426 SARSCOV CORONAVIRUS AG IA: CPT | Performed by: EMERGENCY MEDICINE

## 2021-09-07 PROCEDURE — 99203 OFFICE O/P NEW LOW 30 MIN: CPT | Performed by: EMERGENCY MEDICINE

## 2021-09-07 NOTE — PROGRESS NOTES
Pt here with known no COVID exposure, abx exposure, travels or suspect food but Sx of some loose stools on Sat and then a bout this AM and just feeling \"off\". They are here for screening test. This patient was seen in [facility] at [address] [place] [address] Urgent Care while outdoors at their vehicle due to COVID-19 pandemic with PPE and focused examination in order to decrease community viral transmission  She is not vaccinated    The history is provided by the patient. Past Medical History:   Diagnosis Date    Anxiety     Dr. Yasmine Quintana. started zoloft 2020, buspar 2020. .  consulted MD in [facility] and took xanax prn    Depression     Dr. Jackie Kirby Early menopause 2019    age 37. dx in [place] Elevated liver enzymes 2019    resolved. consulted GI 2019. US normal    Esophageal stricture     s/p eophageal dilation 2021 Dr. Dillan Crews. pylori infection     dx by stool test.  Dr. Mike Benavides. failed prevpak, then did not tolerate Pylera.  HSV (herpes simplex virus) infection     dx with hsv-1, hsv-2    Keratosis pilaris     legs    Palpitations     benign tachycardia. consulted EP/card in [place]  (echo normal) and [place] 2020 (48 hour holter ok)         Past Surgical History:   Procedure Laterality Date    HX  SECTION      HX COLONOSCOPY  2020    normal. Dr. Iqra Barfield (in chart)    HX ENDOSCOPY  2020    mild distal esophagitis. Dr. Iqra Barfield. moderate eosphinphils. tx 8 weeks PPI.  neg. H pylori    HX ENDOSCOPY  2021    esophageal dilation.   Dr. Tank Hammonds      has one remaining ovary    IR ESOPHAGOSCOPY W/BALLOON  2021         Family History   Problem Relation Age of Onset    Hypertension Mother     Pulmonary Embolism Mother     Thyroid Disease Mother     High Cholesterol Mother     Schizophrenia Maternal Aunt     Colon Cancer Maternal Aunt     Heart Attack Maternal Grandmother         Social History Socioeconomic History    Marital status: SINGLE     Spouse name: 0    Number of children: 1    Years of education: Not on file    Highest education level: Not on file   Occupational History    Not on file   Tobacco Use    Smoking status: Never Smoker    Smokeless tobacco: Never Used   Substance and Sexual Activity    Alcohol use: Never    Drug use: Never    Sexual activity: Not on file   Other Topics Concern    Not on file   Social History Narrative    11 year -old son 5/2020     Social Determinants of Health     Financial Resource Strain:     Difficulty of Paying Living Expenses:    Food Insecurity:     Worried About Running Out of Food in the Last Year:     [address] in the Last Year:    Transportation Needs:     Lack of Transportation (Medical):  Lack of Transportation (Non-Medical):    Physical Activity:     Days of Exercise per Week:     Minutes of Exercise per Session:    Stress:     Feeling of Stress :    Social Connections:     Frequency of Communication with Friends and Family:     Frequency of Social Gatherings with Friends and Family:     Attends [employer] Services:     Active Member of Clubs or Organizations:     Attends Club or Organization Meetings:     Marital Status:    Intimate Partner Violence:     Fear of Current or Ex-Partner:     Emotionally Abused:     Physically Abused:     Sexually Abused: ALLERGIES: Patient has no known allergies. Review of Systems   Constitutional: Negative for chills, diaphoresis, fatigue and fever. Mild malaise this AM, some better now   HENT: Negative for congestion, ear pain, sinus pressure, sneezing, sore throat and trouble swallowing. Respiratory: Negative for cough and shortness of breath. Cardiovascular: Negative for chest pain. Gastrointestinal: Positive for diarrhea. Negative for abdominal pain, nausea and vomiting.    Genitourinary: Negative for difficulty urinating, dysuria, flank pain, frequency, menstrual problem (going trough menopause), pelvic pain and urgency. Musculoskeletal: Negative for arthralgias, back pain and myalgias. Skin: Negative for rash. Neurological: Negative for headaches. Vitals:    09/07/21 1156   Pulse: 60   Resp: 12   Temp: 98.5 °F (36.9 °C)   SpO2: 98%       Physical Exam  Vitals and nursing note reviewed. Constitutional:       General: She is not in acute distress. Appearance: Normal appearance. She is normal weight. She is not ill-appearing or toxic-appearing. HENT:      Nose: No rhinorrhea. Mouth/Throat:      Mouth: Mucous membranes are moist.      Pharynx: Oropharynx is clear. No oropharyngeal exudate or posterior oropharyngeal erythema. Cardiovascular:      Rate and Rhythm: Normal rate and regular rhythm. Heart sounds: Normal heart sounds. Pulmonary:      Effort: Pulmonary effort is normal. No respiratory distress. Breath sounds: Normal breath sounds. No stridor. No wheezing, rhonchi or rales. Abdominal:      General: Bowel sounds are normal. There is no distension. Palpations: Abdomen is soft. Tenderness: There is no abdominal tenderness. There is no right CVA tenderness, left CVA tenderness, guarding or rebound. Comments: Limited exam   Neurological:      Mental Status: She is alert and oriented to person, place, and time. Psychiatric:         Mood and Affect: Mood normal.         Behavior: Behavior normal.         MDM    ICD-10-CM ICD-9-CM    1. Encounter for laboratory testing for COVID-19 virus  Z20.822 V01.79 AMB POC SARS-COV-2      NOVEL CORONAVIRUS (COVID-19)   2. Diarrhea, unspecified type  R19.7 787.91 AMB POC SARS-COV-2      NOVEL CORONAVIRUS (COVID-19)     No orders of the defined types were placed in this encounter. The patient's condition and possible alternative diagnoses were discussed with the patient and they verbalized understanding.   The patient is to follow up with their primary care doctor for continued care. If signs and symptoms persist or become worse or new symptoms develop, the pt is to go immediately to the emergency department. Any new medications that may have been written for should be taken as directed but should always be discussed with the primary care physician and pharmacist. This was communicated to the patient. Pt instructed to quarantine until COVID testing results are back and then duration of quarantine will depend on result, current recommendations and symptoms. The patient is to get immediate re-evaluation for any new or worsening symptoms. They are to quarantine from other household members. It was recommended they stay hydrated and practice deep breathing exercises.        Pt aware that any persistent or increased sx or any weakness or ANY belly pain would warrant ED eval   Results for orders placed or performed in visit on 09/07/21   AMB POC SARS-COV-2   Result Value Ref Range    SARS-COV-2 POC Negative Negative       Procedures

## 2021-09-07 NOTE — LETTER
September 7, 2021  Winnie Angeles   [address]    Dear Linda Corporal: Thank you for requesting access to [employer]. Please follow the instructions below to securely access and download your online medical record. [employer] allows you to send messages to your doctor, view your test results, renew your prescriptions, schedule appointments, and more. How Do I Sign Up? 1. In your internet browser, go to [email or web address]. 2. Click on the First Time User? Click Here link in the Sign In box. You will see the New Member Sign Up page. 3. Enter your [employer] Access Code exactly as it appears below. You will not need to use this code after youve completed the sign-up process. If you do not sign up before the expiration date, you must request a new code. [employer] Access Code: [email or web address]  Expires: 9/10/2021  5:43 AM     4. Enter the last four digits of your Social Security Number (xxxx) and Date of Birth (mm/dd/yyyy) as indicated and click Submit. You will be taken to the next sign-up page. 5. Create a [employer] ID. This will be your [employer] login ID and cannot be changed, so think of one that is secure and easy to remember. 6. Create a [employer] password. You can change your password at any time. 7. Enter your Password Reset Question and Answer. This can be used at a later time if you forget your password. 8. Enter your e-mail address. You will receive e-mail notification when new information is available in [facility] [address] 9. Click Sign Up. You can now view and download portions of your medical record. 10. Click the Download Summary menu link to download a portable copy of your medical information. Additional Information    If you have questions, please visit the Frequently Asked Questions section of the [employer] website at [email or web address] Remember, [employer] is NOT to be used for urgent needs. For medical emergencies, dial 911.     Now available from your iPhone and Android!     Sincerely,   [employer]

## 2021-09-09 LAB
SARS-COV-2, NAA 2 DAY TAT: NORMAL
SARS-COV-2, NAA: NOT DETECTED

## 2021-09-29 ENCOUNTER — TELEPHONE (OUTPATIENT)
Dept: INTERNAL MEDICINE CLINIC | Age: 46
End: 2021-09-29

## 2021-09-29 NOTE — LETTER
[address] CONDITION    9/29/2021 12:40 PM    Ms. Alyce Marsh  [address] [address]      To Whom It May Concern: Alyce Marsh is currently under the care of [address] [place] [address],[address]. She is followed by me for an anxiety disorder with panic attacks and depression. She is receiving medical therapy for these conditions. Panic attacks may be severe at times and require time for recovery. If there are questions or concerns please have the patient contact our office.       Sincerely,      Marina Alcantara MD
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
## 2021-09-29 NOTE — TELEPHONE ENCOUNTER
Patient called in to state for her job she needs the doctor to fill out a note for her as she had something on the job happen yesterday that triggered an anxiety attack. She states she just needs a note that says she is under her doctors care, on antidepressants, and just explains the issue and what happens sometimes. Patient states she needs it by tomorrow if possible and would like it sent via [address]

## 2021-10-25 DIAGNOSIS — F41.9 ANXIETY: ICD-10-CM

## 2021-10-25 RX ORDER — BUSPIRONE HYDROCHLORIDE 15 MG/1
15 TABLET ORAL 2 TIMES DAILY
Qty: 180 TABLET | Refills: 0 | Status: SHIPPED | OUTPATIENT
Start: 2021-10-25 | End: 2021-11-30

## 2021-10-25 RX ORDER — SERTRALINE HYDROCHLORIDE 100 MG/1
200 TABLET, FILM COATED ORAL DAILY
Qty: 180 TABLET | Refills: 0 | Status: SHIPPED | OUTPATIENT
Start: 2021-10-25 | End: 2022-01-16

## 2021-10-25 NOTE — TELEPHONE ENCOUNTER
Patient was calling to say she is going to loose her insurance soon - she is worried about not having her medication. She is asking for a three month prescription for two medications - zoloft and buspar. Although she is not out of these medications she is asking to have this written so when her insurance runs out at the end of this month she can continue to stay on them until she finds something else. Please call patient back at earliest convenience.

## 2021-11-17 ENCOUNTER — OFFICE VISIT (OUTPATIENT)
Dept: CARDIOLOGY CLINIC | Age: 46
End: 2021-11-17
Payer: MEDICAID

## 2021-11-17 VITALS
WEIGHT: 141.2 LBS | HEART RATE: 78 BPM | SYSTOLIC BLOOD PRESSURE: 112 MMHG | RESPIRATION RATE: 14 BRPM | DIASTOLIC BLOOD PRESSURE: 70 MMHG | HEIGHT: 67 IN | OXYGEN SATURATION: 99 % | BODY MASS INDEX: 22.16 KG/M2

## 2021-11-17 DIAGNOSIS — I49.9 IRREGULAR HEART BEAT: Primary | ICD-10-CM

## 2021-11-17 PROCEDURE — 99205 OFFICE O/P NEW HI 60 MIN: CPT | Performed by: INTERNAL MEDICINE

## 2021-11-17 PROCEDURE — 93000 ELECTROCARDIOGRAM COMPLETE: CPT | Performed by: INTERNAL MEDICINE

## 2021-11-17 RX ORDER — BISMUTH SUBSALICYLATE 262 MG
1 TABLET,CHEWABLE ORAL 2 TIMES WEEKLY
COMMUNITY

## 2021-11-17 NOTE — PROGRESS NOTES
HISTORY OF PRESENTING ILLNESS      Betty Kramer is a 55 y.o. female with palpitations and possible tachycardia which she is concerned may be due to anxiety versus a tachycardia. She denies syncope, lightheadedness, dizziness, lower extreme edema however has had episodes of generalized chest discomfort which can last 1 to 2 minutes and resolve spontaneously. Denies family history of premature coronary artery disease. She has an iWatch and recorded an episode during symptoms that she reports as \"starting up\" which reviewed by another electrophysiologist and were felt to represent sinus tachycardia. PAST MEDICAL HISTORY     Past Medical History:   Diagnosis Date    Anxiety     Dr. Violet Menon. started zoloft 2020, buspar 2020. .  consulted MD in [facility] and took xanax prn    Depression     Dr. Viktoriya Olivares Early menopause 2019    age 37. dx in [place] Elevated liver enzymes 2019    resolved. consulted GI 2019. US normal    Esophageal stricture     s/p eophageal dilation 2021 Dr. Praful Henry. pylori infection     dx by stool test.  Dr. Rusty Robert. failed prevpak, then did not tolerate Pylera.  HSV (herpes simplex virus) infection     dx with hsv-1, hsv-2    Keratosis pilaris     legs    Palpitations     benign tachycardia. consulted EP/card in [place]  (echo normal) and [place] 2020 (48 hour holter ok)            PAST SURGICAL HISTORY     Past Surgical History:   Procedure Laterality Date    HX  SECTION      HX COLONOSCOPY  2020    normal. Dr. Martina Simon (in chart)    HX ENDOSCOPY  2020    mild distal esophagitis. Dr. Martina Simon. moderate eosphinphils. tx 8 weeks PPI.  neg. H pylori    HX ENDOSCOPY  2021    esophageal dilation.   Dr. Longoria Members      has one remaining ovary    IR ESOPHAGOSCOPY W/BALLOON  2021          ALLERGIES     No Known Allergies       FAMILY HISTORY     Family History   Problem Relation Age of Onset    Hypertension Mother     Pulmonary Embolism Mother     Thyroid Disease Mother     High Cholesterol Mother     Schizophrenia Maternal Aunt     Colon Cancer Maternal Aunt     Heart Attack Maternal Grandmother     negative for cardiac disease       SOCIAL HISTORY     Social History     Socioeconomic History    Marital status: SINGLE     Spouse name: 0    Number of children: 1   Tobacco Use    Smoking status: Never Smoker    Smokeless tobacco: Never Used   Substance and Sexual Activity    Alcohol use: Never    Drug use: Never   Social History Narrative    5 year -old son 5/2020         MEDICATIONS     Current Outpatient Medications   Medication Sig    multivitamin (ONE A DAY) tablet Take 1 Tablet by mouth two (2) times a week.  elderberry fruit (ELDERBERRY PO) Take  by mouth every seven (7) days.  busPIRone (BUSPAR) 15 mg tablet Take 1 Tablet by mouth two (2) times a day.  sertraline (ZOLOFT) 100 mg tablet Take 2 Tablets by mouth daily. (Patient taking differently: Take 150 mg by mouth daily.)    valACYclovir (VALTREX) 500 mg tablet as needed.  fluticasone propionate (Flonase Allergy Relief) 50 mcg/actuation nasal spray 2 Sprays by Both Nostrils route as needed. No current facility-administered medications for this visit. I have reviewed the nurses notes, vitals, problem list, allergy list, medical history, family, social history and medications. REVIEW OF SYMPTOMS      General: Pt denies excessive weight gain or loss. Pt is able to conduct ADL's  HEENT: Denies blurred vision, headaches, hearing loss, epistaxis and difficulty swallowing. Respiratory: Denies cough, congestion, shortness of breath, JEWELL, wheezing or stridor.   Cardiovascular: Denies precordial pain, palpitations, edema or PND  Gastrointestinal: Denies poor appetite, indigestion, abdominal pain or blood in stool  Genitourinary: Denies hematuria, dysuria, increased urinary frequency  Musculoskeletal: Denies joint pain or swelling from muscles or joints  Neurologic: Denies tremor, paresthesias, headache, or sensory motor disturbance  Psychiatric: Denies confusion, insomnia, depression  Integumentray: Denies rash, itching or ulcers. Hematologic: Denies easy bruising, bleeding       PHYSICAL EXAMINATION      Vitals: see vitals section  General: Well developed, in no acute distress. HEENT: No jaundice, oral mucosa moist, no oral ulcers  Neck: Supple, no stiffness, no lymphadenopathy, supple  Heart:  Normal S1/S2 negative S3 or S4. Regular, no murmur, gallop or rub, no jugular venous distention  Respiratory: Clear bilaterally x 4, no wheezing or rales  Abdomen:   Soft, non-tender, bowel sounds are active. Extremities:  No edema, normal cap refill, no cyanosis. Musculoskeletal: No clubbing, no deformities  Neuro: A&Ox3, speech clear, gait stable, cooperative, no focal neurologic deficits  Skin: Skin color is normal. No rashes or lesions.  Non diaphoretic, moist.  Vascular: 2+ pulses symmetric in all extremities       DIAGNOSTIC DATA      EKG:   Visit Vitals  /70 (BP 1 Location: Left upper arm, BP Patient Position: Sitting)   Pulse 78   Resp 14   Ht 5' 7\" (1.702 m)   Wt 141 lb 3.2 oz (64 kg)   SpO2 99%   BMI 22.12 kg/m²        LABORATORY DATA      Lab Results   Component Value Date/Time    WBC 3.6 08/12/2021 02:25 PM    HGB 12.3 08/12/2021 02:25 PM    HCT 39.3 08/12/2021 02:25 PM    PLATELET 512 15/93/3921 02:25 PM    MCV 94.2 08/12/2021 02:25 PM      Lab Results   Component Value Date/Time    Sodium 140 08/12/2021 02:25 PM    Potassium 3.8 08/12/2021 02:25 PM    Chloride 107 08/12/2021 02:25 PM    CO2 30 08/12/2021 02:25 PM    Anion gap 3 (L) 08/12/2021 02:25 PM    Glucose 64 (L) 08/12/2021 02:25 PM    BUN 18 08/12/2021 02:25 PM    Creatinine 0.79 08/12/2021 02:25 PM    BUN/Creatinine ratio 23 (H) 08/12/2021 02:25 PM    GFR est AA >60 08/12/2021 02:25 PM    GFR est non-AA >60 08/12/2021 02:25 PM    Calcium 9.1 08/12/2021 02:25 PM    Bilirubin, total 0.3 08/12/2021 02:25 PM    Alk. phosphatase 70 08/12/2021 02:25 PM    Protein, total 7.2 08/12/2021 02:25 PM    Albumin 4.1 08/12/2021 02:25 PM    Globulin 3.1 08/12/2021 02:25 PM    A-G Ratio 1.3 08/12/2021 02:25 PM    ALT (SGPT) 31 08/12/2021 02:25 PM           ASSESSMENT      1. Chest pain  2. Palpitations       PLAN     Plan for 30-day monitor, echocardiogram and stress echocardiogram to further evaluate patient's presenting symptoms. Encouraged hydration. ICD-10-CM ICD-9-CM    1. Irregular heart beat  I49.9 427.9 AMB POC EKG ROUTINE W/ 12 LEADS, INTER & REP      ECHO STRESS      ECHO ADULT COMPLETE      CARDIAC EVENT MONITOR     Orders Placed This Encounter    AMB POC EKG ROUTINE W/ 12 LEADS, INTER & REP     Order Specific Question:   Reason for Exam:     Answer:   irregular heart beat    multivitamin (ONE A DAY) tablet     Sig: Take 1 Tablet by mouth two (2) times a week.  elderberry fruit (ELDERBERRY PO)     Sig: Take  by mouth every seven (7) days. FOLLOW-UP     After testing  Thank you, Michel Kwok MD for allowing me to participate in the care of this extraordinarily pleasant female. Please do not hesitate to contact me for further questions/concerns.          Anel Thomas MD  Cardiac Electrophysiology / Cardiology    [address] [place] [address].  [address] [place] [address]  [phone] / [phone] Fax   [phone] / [phone] Fax

## 2021-11-17 NOTE — PROGRESS NOTES
Room EP 1  Visit Vitals  /70 (BP 1 Location: Left upper arm, BP Patient Position: Sitting)   Pulse 78   Resp 14   Ht 5' 7\" (1.702 m)   Wt 141 lb 3.2 oz (64 kg)   SpO2 99%   BMI 22.12 kg/m²       Chest pain: no  Shortness of breath: no  Edema: no  Palpitations, Skipped beats, Rapid heartbeat: yes, fast heart beats  Dizziness: no  Fatigue:no    New diagnosis/Surgeries: no    ER/Hospitalizations: no    Refills:no

## 2021-11-19 ENCOUNTER — ANCILLARY PROCEDURE (OUTPATIENT)
Dept: CARDIOLOGY CLINIC | Age: 46
End: 2021-11-19
Payer: MEDICAID

## 2021-11-19 VITALS
DIASTOLIC BLOOD PRESSURE: 64 MMHG | WEIGHT: 141 LBS | HEIGHT: 67 IN | BODY MASS INDEX: 22.13 KG/M2 | SYSTOLIC BLOOD PRESSURE: 106 MMHG

## 2021-11-19 DIAGNOSIS — I49.9 IRREGULAR HEART BEAT: ICD-10-CM

## 2021-11-19 PROCEDURE — 93306 TTE W/DOPPLER COMPLETE: CPT | Performed by: INTERNAL MEDICINE

## 2021-11-21 LAB
ECHO AO ASC DIAM: 3.7 CM
ECHO AO ROOT DIAM: 3.09 CM
ECHO AV AREA PEAK VELOCITY: 2.53 CM2
ECHO AV AREA VTI: 2.47 CM2
ECHO AV AREA/BSA PEAK VELOCITY: 1.5 CM2/M2
ECHO AV AREA/BSA VTI: 1.4 CM2/M2
ECHO AV MEAN GRADIENT: 4.28 MMHG
ECHO AV PEAK GRADIENT: 7.86 MMHG
ECHO AV PEAK VELOCITY: 140.19 CM/S
ECHO AV VTI: 28.72 CM
ECHO EST RA PRESSURE: 8 MMHG
ECHO IVC PROX: 2.16 CM
ECHO LA AREA 4C: 14.68 CM2
ECHO LA MAJOR AXIS: 3.14 CM
ECHO LA MINOR AXIS: 1.8 CM
ECHO LA VOL 2C: 27.47 ML (ref 22–52)
ECHO LA VOL 4C: 39.88 ML (ref 22–52)
ECHO LA VOL BP: 35.38 ML (ref 22–52)
ECHO LA VOL/BSA BIPLANE: 20.33 ML/M2 (ref 16–28)
ECHO LA VOLUME INDEX A2C: 15.79 ML/M2 (ref 16–28)
ECHO LA VOLUME INDEX A4C: 22.92 ML/M2 (ref 16–28)
ECHO LV E' LATERAL VELOCITY: 11.77 CM/S
ECHO LV E' SEPTAL VELOCITY: 8.93 CM/S
ECHO LV EDV A2C: 89.49 ML
ECHO LV EDV A4C: 78.38 ML
ECHO LV EDV BP: 84.34 ML (ref 56–104)
ECHO LV EDV INDEX A4C: 45 ML/M2
ECHO LV EDV INDEX BP: 48.5 ML/M2
ECHO LV EDV NDEX A2C: 51.4 ML/M2
ECHO LV EJECTION FRACTION A2C: 62 PERCENT
ECHO LV EJECTION FRACTION A4C: 66 PERCENT
ECHO LV EJECTION FRACTION BIPLANE: 63.8 PERCENT (ref 55–100)
ECHO LV ESV A2C: 34.29 ML
ECHO LV ESV A4C: 26.82 ML
ECHO LV ESV BP: 30.52 ML (ref 19–49)
ECHO LV ESV INDEX A2C: 19.7 ML/M2
ECHO LV ESV INDEX A4C: 15.4 ML/M2
ECHO LV ESV INDEX BP: 17.5 ML/M2
ECHO LV INTERNAL DIMENSION DIASTOLIC: 4.46 CM (ref 3.9–5.3)
ECHO LV INTERNAL DIMENSION SYSTOLIC: 3.05 CM
ECHO LV IVSD: 0.57 CM (ref 0.6–0.9)
ECHO LV MASS 2D: 78.4 G (ref 67–162)
ECHO LV MASS INDEX 2D: 45.1 G/M2 (ref 43–95)
ECHO LV POSTERIOR WALL DIASTOLIC: 0.64 CM (ref 0.6–0.9)
ECHO LVOT DIAM: 1.93 CM
ECHO LVOT PEAK GRADIENT: 5.85 MMHG
ECHO LVOT PEAK VELOCITY: 120.92 CM/S
ECHO LVOT SV: 70.8 ML
ECHO LVOT VTI: 24.15 CM
ECHO MV A VELOCITY: 62.31 CM/S
ECHO MV E DECELERATION TIME (DT): 166.79 MS
ECHO MV E VELOCITY: 93.65 CM/S
ECHO MV E/A RATIO: 1.5
ECHO MV E/E' LATERAL: 7.96
ECHO MV E/E' RATIO (AVERAGED): 9.22
ECHO MV E/E' SEPTAL: 10.49
ECHO MV MAX VELOCITY: 93.62 CM/S
ECHO MV MEAN GRADIENT: 1.38 MMHG
ECHO MV PEAK GRADIENT: 3.51 MMHG
ECHO MV PRESSURE HALF TIME (PHT): 48.37 MS
ECHO MV VTI: 23.9 CM
ECHO RIGHT VENTRICULAR SYSTOLIC PRESSURE (RVSP): 26.94 MMHG
ECHO RV INTERNAL DIMENSION: 3.51 CM
ECHO RV TAPSE: 2.78 CM (ref 1.5–2)
ECHO TV REGURGITANT MAX VELOCITY: 217.58 CM/S
ECHO TV REGURGITANT PEAK GRADIENT: 18.94 MMHG

## 2021-12-10 ENCOUNTER — APPOINTMENT (OUTPATIENT)
Dept: CARDIOLOGY CLINIC | Age: 46
End: 2021-12-10

## 2021-12-10 ENCOUNTER — ANCILLARY PROCEDURE (OUTPATIENT)
Dept: CARDIOLOGY CLINIC | Age: 46
End: 2021-12-10
Payer: MEDICAID

## 2021-12-10 VITALS — WEIGHT: 141 LBS | BODY MASS INDEX: 22.13 KG/M2 | HEIGHT: 67 IN

## 2021-12-10 DIAGNOSIS — I49.9 IRREGULAR HEART BEAT: ICD-10-CM

## 2021-12-10 PROCEDURE — 93351 STRESS TTE COMPLETE: CPT | Performed by: INTERNAL MEDICINE

## 2021-12-13 LAB
STRESS BASELINE DIAS BP: 64 MMHG
STRESS BASELINE HR: 86 BPM
STRESS BASELINE SYS BP: 108 MMHG
STRESS ESTIMATED WORKLOAD: 10.1 METS
STRESS EXERCISE DUR MIN: NORMAL
STRESS O2 SAT PEAK: 97 %
STRESS O2 SAT REST: 98 %
STRESS PEAK DIAS BP: 76 MMHG
STRESS PEAK SYS BP: 134 MMHG
STRESS PERCENT HR ACHIEVED: 95 %
STRESS POST PEAK HR: 166 BPM
STRESS RATE PRESSURE PRODUCT: NORMAL BPM*MMHG
STRESS ST DEPRESSION: 0 MM
STRESS ST ELEVATION: 0 MM
STRESS TARGET HR: 174 BPM

## 2021-12-22 ENCOUNTER — OFFICE VISIT (OUTPATIENT)
Dept: CARDIOLOGY CLINIC | Age: 46
End: 2021-12-22
Payer: MEDICAID

## 2021-12-22 VITALS
OXYGEN SATURATION: 98 % | DIASTOLIC BLOOD PRESSURE: 78 MMHG | HEART RATE: 76 BPM | WEIGHT: 143.6 LBS | HEIGHT: 67 IN | RESPIRATION RATE: 18 BRPM | SYSTOLIC BLOOD PRESSURE: 120 MMHG | BODY MASS INDEX: 22.54 KG/M2

## 2021-12-22 DIAGNOSIS — I49.9 IRREGULAR HEART BEAT: Primary | ICD-10-CM

## 2021-12-22 PROCEDURE — 99215 OFFICE O/P EST HI 40 MIN: CPT | Performed by: INTERNAL MEDICINE

## 2021-12-22 NOTE — PROGRESS NOTES
HISTORY OF PRESENTING ILLNESS      Christa Mixon is a 55 y.o. female with palpitations and possible tachycardia which she is concerned may be due to anxiety versus a tachycardia. She denies syncope, lightheadedness, dizziness, lower extreme edema however has had episodes of generalized chest discomfort which can last 1 to 2 minutes and resolve spontaneously. Denies family history of premature coronary artery disease. Echo, stress negative. Monitor shows occ ectopy. PAST MEDICAL HISTORY     Past Medical History:   Diagnosis Date    Anxiety     Dr. Kristen Wang. started zoloft 2020, buspar 2020. .  consulted MD in [facility] and took xanax prn    Depression     Dr. Munir Gonsales Early menopause 2019    age 37. dx in [place] Elevated liver enzymes 2019    resolved. consulted GI 2019. US normal    Esophageal stricture     s/p eophageal dilation 2021 Dr. Lisa Freeman. pylori infection     dx by stool test.  Dr. Bambi Dial. failed prevpak, then did not tolerate Pylera.  HSV (herpes simplex virus) infection     dx with hsv-1, hsv-2    Keratosis pilaris     legs    Palpitations     benign tachycardia. consulted EP/card in [place]  (echo normal) and [place] 2020 (48 hour holter ok)            PAST SURGICAL HISTORY     Past Surgical History:   Procedure Laterality Date    HX  SECTION      HX COLONOSCOPY  2020    normal. Dr. Kinjal Stallworth (in chart)    HX ENDOSCOPY  2020    mild distal esophagitis. Dr. Kinjal Stallworth. moderate eosphinphils. tx 8 weeks PPI.  neg. H pylori    HX ENDOSCOPY  2021    esophageal dilation.   Dr. Sonali Daniels      has one remaining ovary    IR ESOPHAGOSCOPY W/BALLOON  2021          ALLERGIES     No Known Allergies       FAMILY HISTORY     Family History   Problem Relation Age of Onset    Hypertension Mother     Pulmonary Embolism Mother     Thyroid Disease Mother     High Cholesterol Mother    Ardyth Moritz Schizophrenia Maternal Aunt     Colon Cancer Maternal Aunt     Heart Attack Maternal Grandmother     negative for cardiac disease       SOCIAL HISTORY     Social History     Socioeconomic History    Marital status: SINGLE     Spouse name: 0    Number of children: 1   Tobacco Use    Smoking status: Never Smoker    Smokeless tobacco: Never Used   Substance and Sexual Activity    Alcohol use: Never    Drug use: Never   Social History Narrative    11 year -old son 5/2020         MEDICATIONS     Current Outpatient Medications   Medication Sig    busPIRone (BUSPAR) 15 mg tablet TAKE 1 TABLET BY MOUTH TWO TIMES A DAY.  multivitamin (ONE A DAY) tablet Take 1 Tablet by mouth two (2) times a week.  elderberry fruit (ELDERBERRY PO) Take  by mouth every seven (7) days.  sertraline (ZOLOFT) 100 mg tablet Take 2 Tablets by mouth daily. (Patient taking differently: Take 150 mg by mouth daily.)    valACYclovir (VALTREX) 500 mg tablet as needed.  fluticasone propionate (Flonase Allergy Relief) 50 mcg/actuation nasal spray 2 Sprays by Both Nostrils route as needed. No current facility-administered medications for this visit. I have reviewed the nurses notes, vitals, problem list, allergy list, medical history, family, social history and medications. REVIEW OF SYMPTOMS      General: Pt denies excessive weight gain or loss. Pt is able to conduct ADL's  HEENT: Denies blurred vision, headaches, hearing loss, epistaxis and difficulty swallowing. Respiratory: Denies cough, congestion, shortness of breath, JEWELL, wheezing or stridor.   Cardiovascular: Denies precordial pain, palpitations, edema or PND  Gastrointestinal: Denies poor appetite, indigestion, abdominal pain or blood in stool  Genitourinary: Denies hematuria, dysuria, increased urinary frequency  Musculoskeletal: Denies joint pain or swelling from muscles or joints  Neurologic: Denies tremor, paresthesias, headache, or sensory motor disturbance  Psychiatric: Denies confusion, insomnia, depression  Integumentray: Denies rash, itching or ulcers. Hematologic: Denies easy bruising, bleeding       PHYSICAL EXAMINATION      Vitals: see vitals section  General: Well developed, in no acute distress. HEENT: No jaundice, oral mucosa moist, no oral ulcers  Neck: Supple, no stiffness, no lymphadenopathy, supple  Heart:  Normal S1/S2 negative S3 or S4. Regular, no murmur, gallop or rub, no jugular venous distention  Respiratory: Clear bilaterally x 4, no wheezing or rales  Abdomen:   Soft, non-tender, bowel sounds are active. Extremities:  No edema, normal cap refill, no cyanosis. Musculoskeletal: No clubbing, no deformities  Neuro: A&Ox3, speech clear, gait stable, cooperative, no focal neurologic deficits  Skin: Skin color is normal. No rashes or lesions. Non diaphoretic, moist.  Vascular: 2+ pulses symmetric in all extremities       DIAGNOSTIC DATA      EKG:   Visit Vitals  /78 (BP 1 Location: Left upper arm, BP Patient Position: Sitting, BP Cuff Size: Adult)   Pulse 76   Resp 18   Ht 5' 7\" (1.702 m)   Wt 143 lb 9.6 oz (65.1 kg)   SpO2 98%   BMI 22.49 kg/m²        LABORATORY DATA      Lab Results   Component Value Date/Time    WBC 3.6 08/12/2021 02:25 PM    HGB 12.3 08/12/2021 02:25 PM    HCT 39.3 08/12/2021 02:25 PM    PLATELET 638 13/34/6312 02:25 PM    MCV 94.2 08/12/2021 02:25 PM      Lab Results   Component Value Date/Time    Sodium 140 08/12/2021 02:25 PM    Potassium 3.8 08/12/2021 02:25 PM    Chloride 107 08/12/2021 02:25 PM    CO2 30 08/12/2021 02:25 PM    Anion gap 3 (L) 08/12/2021 02:25 PM    Glucose 64 (L) 08/12/2021 02:25 PM    BUN 18 08/12/2021 02:25 PM    Creatinine 0.79 08/12/2021 02:25 PM    BUN/Creatinine ratio 23 (H) 08/12/2021 02:25 PM    GFR est AA >60 08/12/2021 02:25 PM    GFR est non-AA >60 08/12/2021 02:25 PM    Calcium 9.1 08/12/2021 02:25 PM    Bilirubin, total 0.3 08/12/2021 02:25 PM    Alk.  phosphatase 70 08/12/2021 02:25 PM    Protein, total 7.2 08/12/2021 02:25 PM    Albumin 4.1 08/12/2021 02:25 PM    Globulin 3.1 08/12/2021 02:25 PM    A-G Ratio 1.3 08/12/2021 02:25 PM    ALT (SGPT) 31 08/12/2021 02:25 PM           ASSESSMENT      1. Chest pain  2. Palpitations       PLAN     Reassurance provided; continue hydration. ICD-10-CM ICD-9-CM    1. Irregular heart beat  I49.9 427.9      No orders of the defined types were placed in this encounter. FOLLOW-UP     1 year      Thank you, Demian Mahoney MD for allowing me to participate in the care of this extraordinarily pleasant female. Please do not hesitate to contact me for further questions/concerns.          Kiet Pino MD  Cardiac Electrophysiology / Cardiology    [address]  [address] [facility], [address]  [phone] / [phone] Fax   [phone] / [phone] Fax

## 2021-12-22 NOTE — PROGRESS NOTES
Room #: 3    Still wearing her event monitor. Visit Vitals  /78 (BP 1 Location: Left upper arm, BP Patient Position: Sitting, BP Cuff Size: Adult)   Pulse 76   Resp 18   Ht 5' 7\" (1.702 m)   Wt 143 lb 9.6 oz (65.1 kg)   SpO2 98%   BMI 22.49 kg/m²         Chest pain:  NO  Shortness of breath:  NO  Edema: NO  Palpitations, skipped beats, rapid heartbeat:  YES  Dizziness:  NO    1. Have you been to the ER, urgent care clinic since your last visit? Hospitalized since your last visit? No    2. Have you seen or consulted any other health care providers outside of the [address] [place] [address] since your last visit? Include any pap smears or colon screening.  No      Refills:  NO

## 2022-03-19 PROBLEM — A04.8 H. PYLORI INFECTION: Status: ACTIVE | Noted: 2020-01-01

## 2022-03-19 PROBLEM — L85.8 KERATOSIS PILARIS: Status: ACTIVE | Noted: 2021-01-01

## 2022-03-19 PROBLEM — E28.319 EARLY MENOPAUSE: Status: ACTIVE | Noted: 2019-03-01

## 2022-03-19 PROBLEM — F41.9 ANXIETY: Status: ACTIVE | Noted: 2019-01-01

## 2022-03-19 PROBLEM — R74.8 ELEVATED LIVER ENZYMES: Status: ACTIVE | Noted: 2019-01-01

## 2022-05-04 ENCOUNTER — OFFICE VISIT (OUTPATIENT)
Dept: INTERNAL MEDICINE CLINIC | Age: 47
End: 2022-05-04
Payer: MEDICAID

## 2022-05-04 VITALS
WEIGHT: 150.4 LBS | RESPIRATION RATE: 16 BRPM | TEMPERATURE: 99 F | BODY MASS INDEX: 23.61 KG/M2 | OXYGEN SATURATION: 99 % | SYSTOLIC BLOOD PRESSURE: 116 MMHG | DIASTOLIC BLOOD PRESSURE: 75 MMHG | HEIGHT: 67 IN | HEART RATE: 76 BPM

## 2022-05-04 DIAGNOSIS — Z13.0 SCREENING, IRON DEFICIENCY ANEMIA: ICD-10-CM

## 2022-05-04 DIAGNOSIS — L65.9 HAIR LOSS: ICD-10-CM

## 2022-05-04 DIAGNOSIS — R13.19 ESOPHAGEAL DYSPHAGIA: ICD-10-CM

## 2022-05-04 DIAGNOSIS — F41.9 ANXIETY: ICD-10-CM

## 2022-05-04 DIAGNOSIS — M94.0 COSTOCHONDRITIS: Primary | ICD-10-CM

## 2022-05-04 DIAGNOSIS — K22.2 ESOPHAGEAL STRICTURE: ICD-10-CM

## 2022-05-04 DIAGNOSIS — M72.2 PLANTAR FASCIITIS, RIGHT: ICD-10-CM

## 2022-05-04 DIAGNOSIS — R63.5 WEIGHT GAIN: ICD-10-CM

## 2022-05-04 PROCEDURE — 99214 OFFICE O/P EST MOD 30 MIN: CPT | Performed by: INTERNAL MEDICINE

## 2022-05-04 RX ORDER — SERTRALINE HYDROCHLORIDE 50 MG/1
150 TABLET, FILM COATED ORAL DAILY
Qty: 270 TABLET | Refills: 1 | Status: SHIPPED | OUTPATIENT
Start: 2022-05-04 | End: 2022-11-04

## 2022-05-04 RX ORDER — BUSPIRONE HYDROCHLORIDE 15 MG/1
15 TABLET ORAL 2 TIMES DAILY
Qty: 180 TABLET | Refills: 1 | Status: SHIPPED | OUTPATIENT
Start: 2022-05-04 | End: 2022-11-04

## 2022-05-04 NOTE — PATIENT INSTRUCTIONS
Plantar Fasciitis: Exercises  Introduction  Here are some examples of exercises for you to try. The exercises may be suggested for a condition or for rehabilitation. Start each exercise slowly. Ease off the exercises if you start to have pain. You will be told when to start these exercises and which ones will work best for you. How to do the exercises  Towel stretch    1. Sit with your legs extended and knees straight. 2. Place a towel around your foot just under the toes. 3. Hold each end of the towel in each hand, with your hands above your knees. 4. Pull back with the towel so that your foot stretches toward you. 5. Hold the position for at least 15 to 30 seconds. 6. Repeat 2 to 4 times a session, up to 5 sessions a day. Calf stretch    This exercise stretches the muscles at the back of the lower leg (the calf) and the Achilles tendon. Do this exercise 3 or 4 times a day, 5 days a week. 1. Stand facing a wall with your hands on the wall at about eye level. Put the leg you want to stretch about a step behind your other leg. 2. Keeping your back heel on the floor, bend your front knee until you feel a stretch in the back leg. 3. Hold the stretch for 15 to 30 seconds. Repeat 2 to 4 times. Plantar fascia and calf stretch    Stretching the plantar fascia and calf muscles can increase flexibility and decrease heel pain. You can do this exercise several times each day and before and after activity. 1. Stand on a step as shown above. Be sure to hold on to the banister. 2. Slowly let your heels down over the edge of the step as you relax your calf muscles. You should feel a gentle stretch across the bottom of your foot and up the back of your leg to your knee. 3. Hold the stretch about 15 to 30 seconds, and then tighten your calf muscle a little to bring your heel back up to the level of the step. Repeat 2 to 4 times.   Towel curls    Make this exercise more challenging by placing a weighted object, such as a soup can, on the other end of the towel. 1. While sitting, place your foot on a towel on the floor and scrunch the towel toward you with your toes. 2. Then, also using your toes, push the towel away from you. [place] pickups    1. Put marbles on the floor next to a cup.  2. Using your toes, try to lift the marbles up from the floor and put them in the cup. Follow-up care is a key part of your treatment and safety. Be sure to make and go to all appointments, and call your doctor if you are having problems. It's also a good idea to know your test results and keep a list of the medicines you take. Where can you learn more? Go to http://www.gray.com/  Enter [id number] in the search box to learn more about \"Plantar Fasciitis: Exercises. \"  Current as of: July 1, 2021               Content Version: 13.2  © 2006-2022 [employer]. Care instructions adapted under license by [employer] (which disclaims liability or warranty for this information). If you have questions about a medical condition or this instruction, always ask your healthcare professional. [place] [address] any warranty or liability for your use of this information. Costochondritis: Care Instructions  Your Care Instructions  You have chest pain because the cartilage of your rib cage is inflamed. This problem is called costochondritis. This type of chest wall pain may last from days to weeks. It is not a heart problem. Sometimes costochondritis occurs with a cold or the flu, and other times the exact cause is not known. Follow-up care is a key part of your treatment and safety. Be sure to make and go to all appointments, and call your doctor if you are having problems. It's also a good idea to know your test results and keep a list of the medicines you take. How can you care for yourself at home? · Take medicines for pain and inflammation exactly as directed.   ? If the doctor gave you a prescription medicine, take it as prescribed. ? If you are not taking a prescription pain medicine, ask your doctor if you can take an over-the-counter medicine. ? Do not take two or more pain medicines at the same time unless the doctor told you to. Many pain medicines have acetaminophen, which is Tylenol. Too much acetaminophen (Tylenol) can be harmful. · It may help to use a warm compress or heating pad (set on low) on your chest. You can also try alternating heat and ice. Put ice or a cold pack on the area for 10 to 20 minutes at a time. Put a thin cloth between the ice and your skin. · Avoid any activity that strains the chest area. As your pain gets better, you can slowly return to your normal activities. · Do not use tape, an elastic bandage, a \"rib belt,\" or anything else that restricts your chest wall motion. When should you call for help? Call 911 anytime you think you may need emergency care. For example, call if:    · You have new or different chest pain or pressure. This may occur with:  ? Sweating. ? Shortness of breath. ? Nausea or vomiting. ? Pain that spreads from the chest to the neck, jaw, or one or both shoulders or arms. ? Dizziness or lightheadedness. ? A fast or uneven pulse. After calling 911, chew 1 adult-strength aspirin. Wait for an ambulance. Do not try to drive yourself.     · You have severe trouble breathing. Call your doctor now or seek immediate medical care if:    · You have a fever or cough.     · You have any trouble breathing.     · Your chest pain gets worse. Watch closely for changes in your health, and be sure to contact your doctor if:    · Your chest pain continues even though you are taking anti-inflammatory medicine.     · Your chest wall pain has not improved after 5 to 7 days. Where can you learn more?   Go to http://www.gray.com/  Enter [id number] in the search box to learn more about \"Costochondritis: Care Instructions. \"  Current as of: July 1, 2021               Content Version: 13.2  © 6510-7871 [employer]wise, [facility]. Care instructions adapted under license by [employer] (which disclaims liability or warranty for this information). If you have questions about a medical condition or this instruction, always ask your healthcare professional. [place] [address] any warranty or liability for your use of this information.

## 2022-05-04 NOTE — PROGRESS NOTES
HISTORY OF PRESENT ILLNESS    Chief Complaint   Patient presents with    Abdominal Pain     Upper left - under breast    Weight Gain       Presents for follow-up    Palpitations are improved. Was told she was dehydrated by cardiology Dr. Marciano Pike 21. Reports discomfort under left breast at times. Feels like a sharp, shooting pain for seconds. Occurs at random. Sudden in onset, improves with positioning. No pain with exertion. No pain with palpation. No hx of rib injury. No SOB, diaphoresis. Hx GERD and esophageal stricture. She saw GI doctor Demario in 2021 and states she had an esophageal dilation which has helped. She is not taking any PPIs at this point. Reports dysphagia 2x per month at times but denies reflux symptoms. Anxiety / Depression  Current treatment includes Zoloft 150 mg and buspar 15 mg bid. Ongoing symptoms include: none. Patient denies: sweating, chest pain, dizziness, paresthesias, racing thoughts, feelings of losing control, difficulty concentrating, suicidal ideation. Denies substance or alcohol abuse. Reported side effects from the treatment: weight? Weight gain  Admits that her diet has not been ideal.  Wt Readings from Last 3 Encounters:   22 150 lb 6.4 oz (68.2 kg)   21 143 lb 9.6 oz (65.1 kg)   12/10/21 141 lb (64 kg)         Review of Systems   All other systems reviewed and are negative, except as noted in HPI    Past Medical and Surgical History   has a past medical history of Anxiety (), Depression (), Early menopause (2019), Elevated liver enzymes (), Esophageal stricture, H. pylori infection (), HSV (herpes simplex virus) infection, Keratosis pilaris (), and Palpitations (). has a past surgical history that includes hx  section (); hx ovarian cyst removal; hx colonoscopy (2020); hx endoscopy (2020); ir esophagoscopy w/balloon (2021); and hx endoscopy (2021).      reports that she has never smoked. She has never used smokeless tobacco. She reports that she does not drink alcohol and does not use drugs. family history includes Colon Cancer in her maternal aunt; Heart Attack in her maternal grandmother; High Cholesterol in her mother; Hypertension in her mother; Pulmonary Embolism in her mother; Schizophrenia in her maternal aunt; Thyroid Disease in her mother. Physical Exam   Nursing note and vitals reviewed. Blood pressure 116/75, pulse 76, temperature 99 °F (37.2 °C), temperature source Oral, resp. rate 16, height 5' 7\" (1.702 m), weight 150 lb 6.4 oz (68.2 kg), SpO2 99 %. Constitutional:  No distress. Eyes: Conjunctivae are normal.   Ears:  Hearing grossly intact  Cardiovascular: Normal rate. regular rhythm, no murmurs or gallops  No significant reproducible tenderness under left breast, left axilla. Complete breast exam was not performed. She denies any masses or reproducible tenderness in the area. No edema  Pulmonary/Chest: Effort normal.   CTAB  Musculoskeletal: moves all 4 extremities   Neurological: Alert and oriented to person, place, and time. Skin: No visible rash noted. Psychiatric: Normal mood and affect. Behavior is mildly anxious pleasant    Assessment and Plan    Diagnoses and all orders for this visit:    1. Costochondritis  Likely cause of her intermittent stabbing chest pain. Suspect pressure of overall water could be contributing. Anxiety also contributing. Reassured this is very unlikely a cardiac issue. Esophageal stricture less likely causing this specific symptom as well. 2. Weight gain  Recommend diet, exercise, decrease carbs. Sertraline may contribute. -     LIPID PANEL; Future  -     METABOLIC PANEL, COMPREHENSIVE; Future  -     TSH 3RD GENERATION; Future    3. Anxiety  Borderline control. Would continue current medications. -     busPIRone (BUSPAR) 15 mg tablet; Take 1 Tablet by mouth two (2) times a day.   -     sertraline (ZOLOFT) 50 mg tablet; Take 3 Tablets by mouth daily. Change to three 50 mg pills (can not swallow cut pills)    4. Esophageal stricture  Intermittent esophageal dysphagia symptoms. History of stricture. Recommend esophagram and then may need referral to GI for upper endoscopy and dilation again. -     XR BA SWALLOW ESOPHOGRAM; Future    5. Esophageal dysphagia  -     XR BA SWALLOW ESOPHOGRAM; Future    6. Plantar fasciitis, right  Mentions a discomfort on the arch of her foot and in her heel. Plantar arch supports, cushioned shoes. Monitor for now. 7. Hair loss  Dizziness and recent hair loss. Check iron levels, thyroid function. Stress may contribute.  -     TSH 3RD GENERATION; Future    8. Screening, iron deficiency anemia  -     TSH 3RD GENERATION; Future  -     FERRITIN; Future      lab results and schedule of future lab studies reviewed with patient  reviewed medications and side effects in detail    Return to clinic for further evaluation if new symptoms develop        Current Outpatient Medications   Medication Sig    busPIRone (BUSPAR) 15 mg tablet Take 1 Tablet by mouth two (2) times a day.  sertraline (ZOLOFT) 50 mg tablet Take 3 Tablets by mouth daily. Change to three 50 mg pills (can not swallow cut pills)    multivitamin (ONE A DAY) tablet Take 1 Tablet by mouth two (2) times a week.  elderberry fruit (ELDERBERRY PO) Take  by mouth every seven (7) days.  valACYclovir (VALTREX) 500 mg tablet as needed.  fluticasone propionate (Flonase Allergy Relief) 50 mcg/actuation nasal spray 2 Sprays by Both Nostrils route as needed. No current facility-administered medications for this visit.

## 2022-05-05 LAB
ALBUMIN SERPL-MCNC: 3.8 G/DL (ref 3.5–5)
ALBUMIN/GLOB SERPL: 1.1 {RATIO} (ref 1.1–2.2)
ALP SERPL-CCNC: 96 U/L (ref 45–117)
ALT SERPL-CCNC: 26 U/L (ref 12–78)
ANION GAP SERPL CALC-SCNC: 6 MMOL/L (ref 5–15)
AST SERPL-CCNC: 18 U/L (ref 15–37)
BILIRUB SERPL-MCNC: 0.3 MG/DL (ref 0.2–1)
BUN SERPL-MCNC: 17 MG/DL (ref 6–20)
BUN/CREAT SERPL: 22 (ref 12–20)
CALCIUM SERPL-MCNC: 8.9 MG/DL (ref 8.5–10.1)
CHLORIDE SERPL-SCNC: 104 MMOL/L (ref 97–108)
CHOLEST SERPL-MCNC: 218 MG/DL
CO2 SERPL-SCNC: 28 MMOL/L (ref 21–32)
CREAT SERPL-MCNC: 0.76 MG/DL (ref 0.55–1.02)
FERRITIN SERPL-MCNC: 43 NG/ML (ref 26–388)
GLOBULIN SER CALC-MCNC: 3.4 G/DL (ref 2–4)
GLUCOSE SERPL-MCNC: 105 MG/DL (ref 65–100)
HDLC SERPL-MCNC: 53 MG/DL
HDLC SERPL: 4.1 {RATIO} (ref 0–5)
LDLC SERPL CALC-MCNC: 133.8 MG/DL (ref 0–100)
POTASSIUM SERPL-SCNC: 3.8 MMOL/L (ref 3.5–5.1)
PROT SERPL-MCNC: 7.2 G/DL (ref 6.4–8.2)
SODIUM SERPL-SCNC: 138 MMOL/L (ref 136–145)
TRIGL SERPL-MCNC: 156 MG/DL (ref ?–150)
TSH SERPL DL<=0.05 MIU/L-ACNC: 1.85 UIU/ML (ref 0.36–3.74)
VLDLC SERPL CALC-MCNC: 31.2 MG/DL

## 2022-05-18 ENCOUNTER — HOSPITAL ENCOUNTER (OUTPATIENT)
Dept: GENERAL RADIOLOGY | Age: 47
Discharge: HOME | End: 2022-05-18
Attending: INTERNAL MEDICINE
Payer: MEDICAID

## 2022-05-18 DIAGNOSIS — K22.2 ESOPHAGEAL STRICTURE: ICD-10-CM

## 2022-05-18 DIAGNOSIS — R13.19 ESOPHAGEAL DYSPHAGIA: ICD-10-CM

## 2022-05-18 PROCEDURE — 74220 X-RAY XM ESOPHAGUS 1CNTRST: CPT

## 2022-09-09 ENCOUNTER — OFFICE VISIT (OUTPATIENT)
Dept: INTERNAL MEDICINE CLINIC | Age: 47
End: 2022-09-09
Payer: MEDICAID

## 2022-09-09 VITALS
OXYGEN SATURATION: 98 % | DIASTOLIC BLOOD PRESSURE: 73 MMHG | HEIGHT: 67 IN | RESPIRATION RATE: 14 BRPM | BODY MASS INDEX: 24.99 KG/M2 | SYSTOLIC BLOOD PRESSURE: 106 MMHG | TEMPERATURE: 98.2 F | WEIGHT: 159.2 LBS | HEART RATE: 72 BPM

## 2022-09-09 DIAGNOSIS — R52 BODY ACHES: Primary | ICD-10-CM

## 2022-09-09 DIAGNOSIS — B34.9 VIRAL ILLNESS: ICD-10-CM

## 2022-09-09 LAB
EXP DATE SOLUTION: NORMAL
EXP DATE SWAB: NORMAL
FLUAV+FLUBV AG NOSE QL IA.RAPID: NEGATIVE
FLUAV+FLUBV AG NOSE QL IA.RAPID: NEGATIVE
LOT NUMBER SOLUTION: NORMAL
LOT NUMBER SWAB: NORMAL
SARS-COV-2 RNA POC: NEGATIVE
VALID INTERNAL CONTROL?: YES

## 2022-09-09 PROCEDURE — 87502 INFLUENZA DNA AMP PROBE: CPT | Performed by: NURSE PRACTITIONER

## 2022-09-09 PROCEDURE — 87635 SARS-COV-2 COVID-19 AMP PRB: CPT | Performed by: NURSE PRACTITIONER

## 2022-09-09 PROCEDURE — 99213 OFFICE O/P EST LOW 20 MIN: CPT | Performed by: NURSE PRACTITIONER

## 2022-09-09 RX ORDER — SODIUM SULFIDE 10 MG/G
GEL TOPICAL
Qty: 236 ML | Refills: 0
Start: 2022-09-09

## 2022-09-09 NOTE — PROGRESS NOTES
Braulio Green (: 1975) is a 52 y.o. female, established patient, here for evaluation of the following chief complaint(s):  Flu Like Symptoms (Headaches, body aches, toes tingling, 4 days, )       ASSESSMENT/PLAN:  Below is the assessment and plan developed based on review of pertinent history, physical exam, labs, studies, and medications. 1. Body aches --negative for influenza and COVID in office  -     AMB POC INFLUENZA A  AND B REAL-TIME RT-PCR  -     AMB POC COVID-19 COV  -     doxylamine-DM-acetaminophen (Coricidin HBP Cold-Multi Sympt) 6.25- mg/15 mL liqd; Take as directed as needed, No Print, Disp-236 mL, R-0    2. Viral illness --self-limited course of illness discussed with patient; encouraged symptomatic treatment. -     doxylamine-DM-acetaminophen (Coricidin HBP Cold-Multi Sympt) 6.25- mg/15 mL liqd; Take as directed as needed, No Print, Disp-236 mL, R-0    Follow-up with PCP if symptoms not improving    SUBJECTIVE/OBJECTIVE:  HPI    Patient of Dr. Susan Layton presents to office with complaints of headache, body aches, fatigue, head congestion for the past 3 days. Reports her son was ill with similar symptoms and has since recovered without medical assistance. Has taken a COVID test yesterday which was negative. Denies fever, chills, nausea, vomiting, diarrhea. Has taken Tylenol for symptoms without significant improvement. Denies shortness of breath, dyspnea. Patient Active Problem List   Diagnosis Code    HSV (herpes simplex virus) infection B00.9    Early menopause E28.319    Elevated liver enzymes R74.8    H. pylori infection A04.8    Anxiety F41.9    Esophageal stricture K22.2    Keratosis pilaris L85.8     Past Surgical History:   Procedure Laterality Date    HX  SECTION      HX COLONOSCOPY  2020    normal. Dr. Joyce De La Garza (in chart)    HX ENDOSCOPY  2020    mild distal esophagitis. Dr. Joyce De La Garza. moderate eosphinphils. tx 8 weeks PPI.  neg.  H pylori    HX ENDOSCOPY  02/2021    esophageal dilation. Dr. Jory Wing      has one remaining ovary    IR ESOPHAGOSCOPY W/BALLOON  02/2021     Social History     Socioeconomic History    Marital status: SINGLE     Spouse name: 0    Number of children: 1    Years of education: Not on file    Highest education level: Not on file   Occupational History    Not on file   Tobacco Use    Smoking status: Never    Smokeless tobacco: Never   Substance and Sexual Activity    Alcohol use: Never    Drug use: Never    Sexual activity: Not on file   Other Topics Concern    Not on file   Social History Narrative    11 year -old son 5/2020     Social Determinants of Health     Financial Resource Strain: Not on file   Food Insecurity: Not on file   Transportation Needs: Not on file   Physical Activity: Not on file   Stress: Not on file   Social Connections: Not on file   Intimate Partner Violence: Not on file   Housing Stability: Not on file     Family History   Problem Relation Age of Onset    Hypertension Mother     Pulmonary Embolism Mother     Thyroid Disease Mother     High Cholesterol Mother     Schizophrenia Maternal Aunt     Colon Cancer Maternal Aunt     Heart Attack Maternal Grandmother      Current Outpatient Medications   Medication Sig    doxylamine-DM-acetaminophen (Coricidin HBP Cold-Multi Sympt) 6.25- mg/15 mL liqd Take as directed as needed    busPIRone (BUSPAR) 15 mg tablet Take 1 Tablet by mouth two (2) times a day. sertraline (ZOLOFT) 50 mg tablet Take 3 Tablets by mouth daily. Change to three 50 mg pills (can not swallow cut pills)    multivitamin (ONE A DAY) tablet Take 1 Tablet by mouth two (2) times a week.    elderberry fruit (ELDERBERRY PO) Take  by mouth every seven (7) days. valACYclovir (VALTREX) 500 mg tablet as needed. fluticasone propionate (FLONASE) 50 mcg/actuation nasal spray 2 Sprays by Both Nostrils route as needed.      No current facility-administered medications for this visit. No Known Allergies    There is no immunization history on file for this patient. Review of Systems   Constitutional:  Positive for fatigue. Negative for chills and fever. HENT:  Positive for congestion. Negative for postnasal drip, sinus pressure, sinus pain and sore throat. Respiratory:  Negative for cough and shortness of breath. Cardiovascular:  Negative for chest pain. Gastrointestinal:  Negative for nausea and vomiting. Musculoskeletal:  Positive for myalgias. Skin:  Negative for rash. Neurological:  Positive for headaches. /73 (BP 1 Location: Left upper arm, BP Patient Position: Sitting, BP Cuff Size: Small adult)   Pulse 72   Temp 98.2 °F (36.8 °C) (Oral)   Resp 14   Ht 5' 7\" (1.702 m)   Wt 159 lb 3.2 oz (72.2 kg)   SpO2 98%   BMI 24.93 kg/m²    Physical Exam  Vitals and nursing note reviewed. Constitutional:       General: She is not in acute distress. Appearance: Normal appearance. HENT:      Head: Normocephalic and atraumatic. Right Ear: Tympanic membrane, ear canal and external ear normal.      Left Ear: Tympanic membrane, ear canal and external ear normal.      Nose: Nose normal.      Mouth/Throat:      Mouth: Mucous membranes are moist.      Pharynx: Oropharynx is clear. Cardiovascular:      Rate and Rhythm: Normal rate and regular rhythm. Pulmonary:      Effort: Pulmonary effort is normal.      Breath sounds: Normal breath sounds. No wheezing or rhonchi. Musculoskeletal:      Cervical back: Normal range of motion. Lymphadenopathy:      Cervical: No cervical adenopathy. Skin:     General: Skin is warm and dry. Neurological:      General: No focal deficit present. Mental Status: She is alert and oriented to person, place, and time. An electronic signature was used to authenticate this note.   -- Rios Taylor NP

## 2022-09-21 ENCOUNTER — TELEPHONE (OUTPATIENT)
Dept: INTERNAL MEDICINE CLINIC | Age: 47
End: 2022-09-21

## 2022-09-21 ENCOUNTER — OFFICE VISIT (OUTPATIENT)
Dept: INTERNAL MEDICINE CLINIC | Age: 47
End: 2022-09-21
Payer: MEDICAID

## 2022-09-21 VITALS
HEART RATE: 72 BPM | SYSTOLIC BLOOD PRESSURE: 117 MMHG | OXYGEN SATURATION: 98 % | TEMPERATURE: 98.2 F | DIASTOLIC BLOOD PRESSURE: 80 MMHG | BODY MASS INDEX: 24.77 KG/M2 | WEIGHT: 157.8 LBS | RESPIRATION RATE: 16 BRPM | HEIGHT: 67 IN

## 2022-09-21 DIAGNOSIS — A08.4 VIRAL GASTROENTERITIS: Primary | ICD-10-CM

## 2022-09-21 DIAGNOSIS — M79.10 MYALGIA: ICD-10-CM

## 2022-09-21 DIAGNOSIS — R19.5 LOOSE STOOLS: ICD-10-CM

## 2022-09-21 DIAGNOSIS — R10.84 GENERALIZED ABDOMINAL PAIN: ICD-10-CM

## 2022-09-21 LAB
BILIRUB UR QL STRIP: NEGATIVE
GLUCOSE UR-MCNC: NEGATIVE MG/DL
KETONES P FAST UR STRIP-MCNC: NEGATIVE MG/DL
PH UR STRIP: 7 [PH] (ref 4.6–8)
PROT UR QL STRIP: NEGATIVE
SP GR UR STRIP: 1.02 (ref 1–1.03)
UA UROBILINOGEN AMB POC: NORMAL (ref 0.2–1)
URINALYSIS CLARITY POC: CLEAR
URINALYSIS COLOR POC: YELLOW
URINE BLOOD POC: NEGATIVE
URINE LEUKOCYTES POC: NEGATIVE
URINE NITRITES POC: NEGATIVE

## 2022-09-21 PROCEDURE — 99213 OFFICE O/P EST LOW 20 MIN: CPT | Performed by: INTERNAL MEDICINE

## 2022-09-21 PROCEDURE — 81003 URINALYSIS AUTO W/O SCOPE: CPT | Performed by: INTERNAL MEDICINE

## 2022-09-21 NOTE — PROGRESS NOTES
HISTORY OF PRESENT ILLNESS    Chief Complaint   Patient presents with    Abdominal Pain     Lower - 3 days    Diarrhea     Loose stool - 3 days    Generalized Body Aches    Headache     Does not feel stable. Presents with loose stools, myalgia, headache, intermittent severe lower abd pain for 3 days. Overall, diarrhea is improving some. No n/v. No fever, chills. Denies urinary s/s. Seen  for myalgia. Covid, flu neg. Improved. Associated symptoms include: mild dizziness? Works at dental office. Wears mask. No sick contacts known. Did have family member in town this weekend from [place]. .  No food changes. No recent antibiotics    Difficulty working. Anxiety is borderline controlled w random pain, tingling sensations. Taking zoloft 150 mg, buspar 15 mg bid      Review of Systems   All other systems reviewed and are negative, except as noted in HPI    Past Medical and Surgical History   has a past medical history of Anxiety (), Depression (), Early menopause (2019), Elevated liver enzymes (), Esophageal stricture, H. pylori infection (), HSV (herpes simplex virus) infection, Keratosis pilaris (), and Palpitations (). has a past surgical history that includes hx  section (); hx ovarian cyst removal; hx colonoscopy (2020); hx endoscopy (2020); ir esophagoscopy w/balloon (2021); and hx endoscopy (2021). reports that she has never smoked. She has never used smokeless tobacco. She reports that she does not drink alcohol and does not use drugs. family history includes Colon Cancer in her maternal aunt; Heart Attack in her maternal grandmother; High Cholesterol in her mother; Hypertension in her mother; Pulmonary Embolism in her mother; Schizophrenia in her maternal aunt; Thyroid Disease in her mother. Physical Exam   Nursing note and vitals reviewed.   Blood pressure 117/80, pulse 72, temperature 98.2 °F (36.8 °C), temperature source Oral, resp. rate 16, height 5' 7\" (1.702 m), weight 157 lb 12.8 oz (71.6 kg), SpO2 98 %. Constitutional: In no distress. Eyes: Conjunctivae are normal.  HEENT:  No LAD or thyromegaly   Cardiovascular: Normal rate. regular rhythm. No murmurs  No edema  Pulmonary/Chest: Effort normal. clear to ausculation blaterally  Musculoskeletal:  no edema. Abd:  Neurological: Alert and oriented. Grossly intact cranial nerves and motor function. Skin: No visible rash noted. Psychiatric: Normal mood and affect. Behavior is normal.     ASSESSMENT and PLAN  1. Viral gastroenteritis  2. Loose stools  3. Generalized abdominal pain  -     AMB POC URINALYSIS DIP STICK AUTO W/O MICRO - normal  4. Myalgia  Improving viral syndrome likely. Work note for today and tomorrow. Return to clinic for further evaluation if new symptoms develop or if current symptoms worsen or fail to resolve. 5.  Anxiety  Monitor for now.      lab results and schedule of future lab studies reviewed with patient  reviewed medications and side effects in detail    Return to clinic for further evaluation if new symptoms develop or if current symptoms worsen or fail to resolve.

## 2022-09-21 NOTE — LETTER
NOTIFICATION RETURN TO WORK / SCHOOL    9/21/2022 11:52 AM    Ms. Darylene Margo  [place] [phone]      To Whom It May Concern: Darylene Margo is currently under the care of [address] [place] [address],[address]. She will return to work/school on: 9/23/22    If there are questions or concerns please have the patient contact our office.         Sincerely,      Francine Mcneal MD What Type Of Note Output Would You Prefer (Optional)?: Standard Output How Severe Is Your Skin Lesion?: mild Has Your Skin Lesion Been Treated?: not been treated Is This A New Presentation, Or A Follow-Up?: Skin Lesion

## 2022-09-21 NOTE — TELEPHONE ENCOUNTER
Spoke with patient and she reports that she has had Stomach and Abdominal Pain with Loose stools for the last 3 days. She denies any respiratory symptoms. She reports hot flashes. She reports that she simply does not feel well. She is requesting an appointment for today and scheduled for today at 11:00 AM same day appt. Grateful for the call.

## 2022-11-03 PROBLEM — R00.2 PALPITATIONS: Status: ACTIVE | Noted: 2019-01-01

## 2022-11-04 DIAGNOSIS — F41.9 ANXIETY: ICD-10-CM

## 2022-11-04 RX ORDER — SERTRALINE HYDROCHLORIDE 50 MG/1
150 TABLET, FILM COATED ORAL DAILY
Qty: 90 TABLET | Refills: 5 | Status: SHIPPED | OUTPATIENT
Start: 2022-11-04

## 2022-11-04 RX ORDER — BUSPIRONE HYDROCHLORIDE 15 MG/1
TABLET ORAL
Qty: 60 TABLET | Refills: 5 | Status: SHIPPED | OUTPATIENT
Start: 2022-11-04

## 2022-12-16 ENCOUNTER — TELEPHONE (OUTPATIENT)
Dept: INTERNAL MEDICINE CLINIC | Age: 47
End: 2022-12-16

## 2022-12-16 NOTE — TELEPHONE ENCOUNTER
12/16/2022 at 11:05 am--This user called and spoke with the patient and let her know we recommend that she treat her symptoms with OTC medication's (Mucinex, allegra, delsym) and if her symptoms are not improving by next week or getting worse to let us know. Patient verbalized understanding and all question's were answered at that time.

## 2022-12-16 NOTE — TELEPHONE ENCOUNTER
12/16/2022 at 10:48 am--This user called and spoke with the patient, patient stated that she tested positive last night for Covid. I let her know that I would consult with the provider and give her a call back. Patient verbalized understanding and did not have any question's or concerns at that time.

## 2022-12-16 NOTE — TELEPHONE ENCOUNTER
Patient called to state she has Covid and would like to know if a z-inessa could be prescribed for her. Please call back and advise.

## 2022-12-20 DIAGNOSIS — F41.9 ANXIETY: ICD-10-CM

## 2022-12-23 ENCOUNTER — TELEPHONE (OUTPATIENT)
Dept: INTERNAL MEDICINE CLINIC | Age: 47
End: 2022-12-23

## 2022-12-23 DIAGNOSIS — R05.9 COUGH, UNSPECIFIED TYPE: Primary | ICD-10-CM

## 2022-12-23 RX ORDER — TETANUS TOXOID, REDUCED DIPHTHERIA TOXOID AND ACELLULAR PERTUSSIS VACCINE, ADSORBED 5; 2.5; 8; 8; 2.5 [IU]/.5ML; [IU]/.5ML; UG/.5ML; UG/.5ML; UG/.5ML
0.5 SUSPENSION INTRAMUSCULAR ONCE
Qty: 1 EACH | Refills: 0 | Status: SHIPPED | OUTPATIENT
Start: 2022-12-23 | End: 2022-12-23 | Stop reason: ALTCHOICE

## 2022-12-23 RX ORDER — PNEUMOCOCCAL 20-VALENT CONJUGATE VACCINE 2.2; 2.2; 2.2; 2.2; 2.2; 2.2; 2.2; 2.2; 2.2; 2.2; 2.2; 2.2; 2.2; 2.2; 2.2; 2.2; 4.4; 2.2; 2.2; 2.2 UG/.5ML; UG/.5ML; UG/.5ML; UG/.5ML; UG/.5ML; UG/.5ML; UG/.5ML; UG/.5ML; UG/.5ML; UG/.5ML; UG/.5ML; UG/.5ML; UG/.5ML; UG/.5ML; UG/.5ML; UG/.5ML; UG/.5ML; UG/.5ML; UG/.5ML; UG/.5ML
0.5 INJECTION, SUSPENSION INTRAMUSCULAR ONCE
Qty: 1 EACH | Refills: 0 | Status: SHIPPED | OUTPATIENT
Start: 2022-12-23 | End: 2022-12-23 | Stop reason: ALTCHOICE

## 2022-12-23 RX ORDER — AZITHROMYCIN 250 MG/1
TABLET, FILM COATED ORAL
Qty: 6 TABLET | Refills: 0 | Status: SHIPPED | OUTPATIENT
Start: 2022-12-23 | End: 2022-12-28

## 2022-12-23 RX ORDER — CODEINE PHOSPHATE AND GUAIFENESIN 10; 100 MG/5ML; MG/5ML
5 SOLUTION ORAL
Qty: 110 ML | Refills: 0 | Status: SHIPPED | OUTPATIENT
Start: 2022-12-23 | End: 2023-01-02

## 2022-12-23 NOTE — TELEPHONE ENCOUNTER
Spoke with patient and advised that the orders for the Prevnar 20 and Tdap were in fact in error and to please disregard. She states understanding and grateful for the call back.

## 2022-12-23 NOTE — TELEPHONE ENCOUNTER
Spoke with patient and she reports that on 12/15/22 she tested positive for COVID. She reports that her symptoms with OTC medication's (Mucinex, allegra, delsym) are not improving. She reports that she is still have a fever at night 100.0. Cough and congestion persists. She says her chest feels heavy. Expectorating Yellow/Green/Brownish phlegm. She denies being SOB but states is a little with exertion. Unable to get rest at night due to cough. She is asking for an appt and none available at this time. She is reluctant to go to Urgent care at this time. Dr. Agnieszka Rogel notified.

## 2022-12-23 NOTE — TELEPHONE ENCOUNTER
I sent in zpak for bronchitis, PNA and robisussin w codeine. Do advise urgent care if ER if progressing.

## 2022-12-23 NOTE — TELEPHONE ENCOUNTER
Spoke with patient and update on script sent to her pharmacy for Z-Antolin for Bronchitis. Advised if she fails to improve and symptoms worsen to go to ER for evaluation. She states understanding and grateful for the call.

## 2022-12-23 NOTE — TELEPHONE ENCOUNTER
Patient is calling to follow up on her message form 12/16, states she has tried OTC medication without any improvement, requesting to speak with the nurse to discuss next steps, requesting to speak with the nurse to discuss next steps.  No available apts with PCP

## 2022-12-27 RX ORDER — BUSPIRONE HYDROCHLORIDE 15 MG/1
TABLET ORAL
Qty: 60 TABLET | Refills: 5 | Status: SHIPPED | OUTPATIENT
Start: 2022-12-27

## 2022-12-27 NOTE — TELEPHONE ENCOUNTER
Requested Prescriptions     Pending Prescriptions Disp Refills    busPIRone (BUSPAR) 15 mg tablet 60 Tablet 5     Sig: TAKE 1 TABLET BY MOUTH TWO TIMES A DAY. No Known Allergies    Last visit with ordering provider: 9/21/22  Next visit with ordering provider:none on file     Is order duplicate: no    Current Outpatient Medications   Medication Instructions    azithromycin (ZITHROMAX) 250 mg tablet Take 2 tab today, then 1 tab daily for 4 days    busPIRone (BUSPAR) 15 mg tablet TAKE 1 TABLET BY MOUTH TWO TIMES A DAY.     doxylamine-DM-acetaminophen (Coricidin HBP Cold-Multi Sympt) 6.25- mg/15 mL liqd Take as directed as needed    elderberry fruit (ELDERBERRY PO) Oral, EVERY 7 DAYS    fluticasone propionate (FLONASE) 50 mcg/actuation nasal spray 2 Sprays, Both Nostrils, AS NEEDED    guaiFENesin-codeine (ROBITUSSIN AC) 100-10 mg/5 mL solution 5 mL, Oral, 3 TIMES DAILY AS NEEDED    multivitamin (ONE A DAY) tablet 1 Tablet, Oral, 2 TIMES WEEKLY    sertraline (ZOLOFT) 150 mg, Oral, DAILY, Change to three 50 mg pills (can not swallow cut pills)    valACYclovir (VALTREX) 500 mg tablet AS NEEDED       Signed By: Herbert Irwin     December 27, 2022

## 2022-12-28 NOTE — TELEPHONE ENCOUNTER
Refill request for buspar      No Known Allergies    Last visit with ordering provider:  Next visit with ordering provider:  Is order duplicate: yes    Current Outpatient Medications   Medication Instructions    azithromycin (ZITHROMAX) 250 mg tablet Take 2 tab today, then 1 tab daily for 4 days    busPIRone (BUSPAR) 15 mg tablet TAKE 1 TABLET BY MOUTH TWO TIMES A DAY.     doxylamine-DM-acetaminophen (Coricidin HBP Cold-Multi Sympt) 6.25- mg/15 mL liqd Take as directed as needed    elderberry fruit (ELDERBERRY PO) Oral, EVERY 7 DAYS    fluticasone propionate (FLONASE) 50 mcg/actuation nasal spray 2 Sprays, Both Nostrils, AS NEEDED    guaiFENesin-codeine (ROBITUSSIN AC) 100-10 mg/5 mL solution 5 mL, Oral, 3 TIMES DAILY AS NEEDED    multivitamin (ONE A DAY) tablet 1 Tablet, Oral, 2 TIMES WEEKLY    sertraline (ZOLOFT) 150 mg, Oral, DAILY, Change to three 50 mg pills (can not swallow cut pills)    valACYclovir (VALTREX) 500 mg tablet AS NEEDED       Signed By: Kurt Hartmann     December 28, 2022

## 2023-01-03 ENCOUNTER — TELEPHONE (OUTPATIENT)
Dept: INTERNAL MEDICINE CLINIC | Age: 48
End: 2023-01-03

## 2023-01-10 DIAGNOSIS — F41.9 ANXIETY: ICD-10-CM

## 2023-01-10 RX ORDER — SERTRALINE HYDROCHLORIDE 50 MG/1
150 TABLET, FILM COATED ORAL DAILY
Qty: 270 TABLET | Refills: 2 | Status: SHIPPED | OUTPATIENT
Start: 2023-01-10

## 2023-02-10 ENCOUNTER — OFFICE VISIT (OUTPATIENT)
Dept: INTERNAL MEDICINE CLINIC | Age: 48
End: 2023-02-10
Payer: MEDICAID

## 2023-02-10 VITALS
SYSTOLIC BLOOD PRESSURE: 112 MMHG | RESPIRATION RATE: 17 BRPM | OXYGEN SATURATION: 95 % | HEIGHT: 67 IN | WEIGHT: 161.4 LBS | HEART RATE: 71 BPM | BODY MASS INDEX: 25.33 KG/M2 | DIASTOLIC BLOOD PRESSURE: 74 MMHG | TEMPERATURE: 99.1 F

## 2023-02-10 DIAGNOSIS — K59.00 CONSTIPATION, UNSPECIFIED CONSTIPATION TYPE: Primary | ICD-10-CM

## 2023-02-10 DIAGNOSIS — R68.89 OTHER GENERAL SYMPTOMS AND SIGNS: ICD-10-CM

## 2023-02-10 DIAGNOSIS — Z86.16 HISTORY OF COVID-19: ICD-10-CM

## 2023-02-10 DIAGNOSIS — B00.9 HSV (HERPES SIMPLEX VIRUS) INFECTION: ICD-10-CM

## 2023-02-10 PROCEDURE — 99213 OFFICE O/P EST LOW 20 MIN: CPT | Performed by: INTERNAL MEDICINE

## 2023-02-10 RX ORDER — VALACYCLOVIR HYDROCHLORIDE 500 MG/1
500 TABLET, FILM COATED ORAL
Qty: 60 TABLET | Refills: 4 | Status: SHIPPED | OUTPATIENT
Start: 2023-02-10

## 2023-02-10 RX ORDER — LACTULOSE 10 G/15ML
30 SOLUTION ORAL; RECTAL
Qty: 1000 ML | Refills: 1 | Status: SHIPPED | OUTPATIENT
Start: 2023-02-10

## 2023-02-10 NOTE — PROGRESS NOTES
HISTORY OF PRESENT ILLNESS    Chief Complaint   Patient presents with    Post-COVID Symptoms     No symptoms - just want a check up to makes sure there is no longer term damage. Presents for follow-up    She works as a dental assistant and states that she does not like her current work environment. Feels she is not getting paid what she deserves. Reports that on 12/15/22 she tested positive for COVID-19. Felt sick  still and I sent in zpak  she remained sick for another 2 weeks. Energy levels are back to baseline. She would like to know if there is any residual damage to her body after having COVID-19. Declines all vaccines. States that she worries about the side effects of those more than getting any illness. Reports pains intermittently in all areas, sharp, burning, stinging pains which last seconds and resolve. She knows she does not drink enough water. Acne, mild  Zoloft and buspar make her feel \"sick\" now. Constipation. This is moderate to severe. Has great difficulty having bowel movements more than once or twice a week. She does not like trying any laxatives because they cause significant cramps. Says MiraLAX has not helped in the past.  Asks for a different option that likely will not cause cramps. Recent outbreak of vaginal HSV. Has been asymptomatic for recently. Review of Systems   All other systems reviewed and are negative, except as noted in HPI    Past Medical and Surgical History   has a past medical history of Anxiety (), Depression (), Early menopause (2019), Elevated liver enzymes (), Esophageal stricture, H. pylori infection (), HSV (herpes simplex virus) infection, Keratosis pilaris (), and Palpitations ().    has a past surgical history that includes hx  section (); hx ovarian cyst removal; hx colonoscopy (2020); hx endoscopy (2020); ir esophagoscopy w/balloon (2021); and hx endoscopy (02/2021). reports that she has never smoked. She has never used smokeless tobacco. She reports that she does not drink alcohol and does not use drugs. family history includes Colon Cancer in her maternal aunt; Heart Attack in her maternal grandmother; High Cholesterol in her mother; Hypertension in her mother; Pulmonary Embolism in her mother; Schizophrenia in her maternal aunt; Thyroid Disease in her mother. Physical Exam   Nursing note and vitals reviewed. Blood pressure 112/74, pulse 71, temperature 99.1 °F (37.3 °C), temperature source Oral, resp. rate 17, height 5' 7\" (1.702 m), weight 161 lb 6.4 oz (73.2 kg), SpO2 95 %. Constitutional:  No distress. Eyes: Conjunctivae are normal.   Ears:  Hearing grossly intact  Cardiovascular: Normal rate. regular rhythm, no murmurs or gallops  No edema  Pulmonary/Chest: Effort normal.   CTAB  Musculoskeletal: moves all 4 extremities   Neurological: Alert and oriented to person, place, and time. Skin: No visible rash noted. Psychiatric: Normal mood and affect. Behavior is normal.     Assessment and Plan    Diagnoses and all orders for this visit:    1. Constipation, unspecified constipation type  Uncontrolled, severe. MiraLAX failure. Stimulant laxative intolerance. Trial of lactulose to take as needed. May benefit from trial of Linzess for chronic idiopathic constipation. We will readdress in the future. -     lactulose (CHRONULAC) 10 gram/15 mL solution; Take 45 mL by mouth three (3) times daily as needed (constipation). Indications: constipation    2. History of COVID-19  Does not have any obvious residual signs symptoms of COVID-19. Reassured. No further testing needed, but will check metabolic panel, CBC and TSH to assess for any abnormalities. 3. Other general symptoms and signs  -     METABOLIC PANEL, COMPREHENSIVE; Future  -     CBC WITH AUTOMATED DIFF; Future  -     TSH 3RD GENERATION; Future    4.  HSV (herpes simplex virus) infection  Current outbreak. Take 500 mg twice daily for 3 days as needed for outbreaks. -     valACYclovir (VALTREX) 500 mg tablet; Take 1 Tablet by mouth two (2) times daily as needed for PRN Reason (Other) (outbreak). Indications: recurrent genital herpes    lab results and schedule of future lab studies reviewed with patient  reviewed medications and side effects in detail    Return to clinic for further evaluation if new symptoms develop        Current Outpatient Medications   Medication Sig    valACYclovir (VALTREX) 500 mg tablet Take 1 Tablet by mouth two (2) times daily as needed for PRN Reason (Other) (outbreak). Indications: recurrent genital herpes    lactulose (CHRONULAC) 10 gram/15 mL solution Take 45 mL by mouth three (3) times daily as needed (constipation). Indications: constipation    sertraline (ZOLOFT) 50 mg tablet Take 3 Tablets by mouth daily. Change to three 50 mg pills (can not swallow cut pills)    busPIRone (BUSPAR) 15 mg tablet TAKE 1 TABLET BY MOUTH TWO TIMES A DAY. doxylamine-DM-acetaminophen (Coricidin HBP Cold-Multi Sympt) 6.25- mg/15 mL liqd Take as directed as needed    multivitamin (ONE A DAY) tablet Take 1 Tablet by mouth two (2) times a week.    elderberry fruit (ELDERBERRY PO) Take  by mouth every seven (7) days. valACYclovir (VALTREX) 500 mg tablet as needed. fluticasone propionate (FLONASE) 50 mcg/actuation nasal spray 2 Sprays by Both Nostrils route as needed. No current facility-administered medications for this visit.

## 2023-02-11 LAB
ALBUMIN SERPL-MCNC: 4.6 G/DL (ref 3.8–4.8)
ALBUMIN/GLOB SERPL: 1.8 {RATIO} (ref 1.2–2.2)
ALP SERPL-CCNC: 89 IU/L (ref 44–121)
ALT SERPL-CCNC: 11 IU/L (ref 0–32)
AST SERPL-CCNC: 17 IU/L (ref 0–40)
BASOPHILS # BLD AUTO: 0 X10E3/UL (ref 0–0.2)
BASOPHILS NFR BLD AUTO: 1 %
BILIRUB SERPL-MCNC: <0.2 MG/DL (ref 0–1.2)
BUN SERPL-MCNC: 15 MG/DL (ref 6–24)
BUN/CREAT SERPL: 19 (ref 9–23)
CALCIUM SERPL-MCNC: 9.8 MG/DL (ref 8.7–10.2)
CHLORIDE SERPL-SCNC: 101 MMOL/L (ref 96–106)
CO2 SERPL-SCNC: 25 MMOL/L (ref 20–29)
CREAT SERPL-MCNC: 0.8 MG/DL (ref 0.57–1)
EGFRCR SERPLBLD CKD-EPI 2021: 91 ML/MIN/1.73
EOSINOPHIL # BLD AUTO: 0.1 X10E3/UL (ref 0–0.4)
EOSINOPHIL NFR BLD AUTO: 2 %
ERYTHROCYTE [DISTWIDTH] IN BLOOD BY AUTOMATED COUNT: 14.4 % (ref 11.7–15.4)
GLOBULIN SER CALC-MCNC: 2.5 G/DL (ref 1.5–4.5)
GLUCOSE SERPL-MCNC: 57 MG/DL (ref 70–99)
HCT VFR BLD AUTO: 38.5 % (ref 34–46.6)
HGB BLD-MCNC: 12.6 G/DL (ref 11.1–15.9)
IMM GRANULOCYTES # BLD AUTO: 0 X10E3/UL (ref 0–0.1)
IMM GRANULOCYTES NFR BLD AUTO: 0 %
LYMPHOCYTES # BLD AUTO: 1.6 X10E3/UL (ref 0.7–3.1)
LYMPHOCYTES NFR BLD AUTO: 41 %
MCH RBC QN AUTO: 28.4 PG (ref 26.6–33)
MCHC RBC AUTO-ENTMCNC: 32.7 G/DL (ref 31.5–35.7)
MCV RBC AUTO: 87 FL (ref 79–97)
MONOCYTES # BLD AUTO: 0.5 X10E3/UL (ref 0.1–0.9)
MONOCYTES NFR BLD AUTO: 13 %
NEUTROPHILS # BLD AUTO: 1.6 X10E3/UL (ref 1.4–7)
NEUTROPHILS NFR BLD AUTO: 43 %
PLATELET # BLD AUTO: 250 X10E3/UL (ref 150–450)
POTASSIUM SERPL-SCNC: 4.2 MMOL/L (ref 3.5–5.2)
PROT SERPL-MCNC: 7.1 G/DL (ref 6–8.5)
RBC # BLD AUTO: 4.43 X10E6/UL (ref 3.77–5.28)
SODIUM SERPL-SCNC: 139 MMOL/L (ref 134–144)
TSH SERPL DL<=0.005 MIU/L-ACNC: 2.47 UIU/ML (ref 0.45–4.5)
WBC # BLD AUTO: 3.8 X10E3/UL (ref 3.4–10.8)

## 2023-02-28 DIAGNOSIS — F41.9 ANXIETY: ICD-10-CM

## 2023-02-28 RX ORDER — BUSPIRONE HYDROCHLORIDE 15 MG/1
TABLET ORAL
Qty: 180 TABLET | Refills: 1 | Status: SHIPPED | OUTPATIENT
Start: 2023-02-28 | End: 2023-03-01 | Stop reason: SDUPTHER

## 2023-03-01 DIAGNOSIS — F41.9 ANXIETY: ICD-10-CM

## 2023-03-01 RX ORDER — BUSPIRONE HYDROCHLORIDE 15 MG/1
TABLET ORAL
Qty: 180 TABLET | Refills: 1 | Status: SHIPPED | OUTPATIENT
Start: 2023-03-01

## 2023-03-01 NOTE — TELEPHONE ENCOUNTER
Requested Prescriptions     Pending Prescriptions Disp Refills    busPIRone (BUSPAR) 15 mg tablet 180 Tablet 1     Sig: TAKE 1 TABLET BY MOUTH TWO TIMES A DAY. No Known Allergies    Last visit with ordering provider: 2/10/23  Next visit with ordering provider: none on file  Is order duplicate: yes, last signed 2/28/23    Current Outpatient Medications   Medication Instructions    busPIRone (BUSPAR) 15 mg tablet TAKE 1 TABLET BY MOUTH TWO TIMES A DAY.     doxylamine-DM-acetaminophen (Coricidin HBP Cold-Multi Sympt) 6.25- mg/15 mL liqd Take as directed as needed    elderberry fruit (ELDERBERRY PO) Oral, EVERY 7 DAYS    fluticasone propionate (FLONASE) 50 mcg/actuation nasal spray 2 Sprays, Both Nostrils, AS NEEDED    lactulose (CHRONULAC) 10 gram/15 mL solution 45 mL, Oral, 3 TIMES DAILY AS NEEDED    multivitamin (ONE A DAY) tablet 1 Tablet, Oral, 2 TIMES WEEKLY    sertraline (ZOLOFT) 150 mg, Oral, DAILY, Change to three 50 mg pills (can not swallow cut pills)    valACYclovir (VALTREX) 500 mg tablet AS NEEDED    valACYclovir (VALTREX) 500 mg, Oral, 2 TIMES DAILY AS NEEDED       Signed By: Michelle Johns     March 1, 2023

## 2023-05-01 ENCOUNTER — TELEPHONE (OUTPATIENT)
Dept: INTERNAL MEDICINE CLINIC | Age: 48
End: 2023-05-01

## 2023-05-01 ENCOUNTER — HOSPITAL ENCOUNTER (EMERGENCY)
Age: 48
Discharge: HOME | End: 2023-05-01
Attending: EMERGENCY MEDICINE
Payer: MEDICAID

## 2023-05-01 VITALS
OXYGEN SATURATION: 99 % | RESPIRATION RATE: 16 BRPM | WEIGHT: 160 LBS | TEMPERATURE: 99.5 F | HEART RATE: 103 BPM | DIASTOLIC BLOOD PRESSURE: 69 MMHG | SYSTOLIC BLOOD PRESSURE: 117 MMHG | BODY MASS INDEX: 25.11 KG/M2 | HEIGHT: 67 IN

## 2023-05-01 DIAGNOSIS — L73.9 ACUTE FOLLICULITIS: Primary | ICD-10-CM

## 2023-05-01 PROCEDURE — 99283 EMERGENCY DEPT VISIT LOW MDM: CPT

## 2023-05-01 RX ORDER — CLINDAMYCIN PHOSPHATE 10 MG/G
GEL TOPICAL 2 TIMES DAILY
Qty: 30 G | Refills: 0 | Status: SHIPPED | OUTPATIENT
Start: 2023-05-01

## 2023-05-01 RX ORDER — CEPHALEXIN 500 MG/1
500 CAPSULE ORAL 4 TIMES DAILY
Qty: 28 CAPSULE | Refills: 0 | Status: SHIPPED | OUTPATIENT
Start: 2023-05-01 | End: 2023-05-08

## 2023-05-01 NOTE — TELEPHONE ENCOUNTER
Spoke with patient and she reports pain and swelling in Left groin started 4/29/23. Painful to touch. She denies any injuries. Painful with ambulation. She is demanding an appt with Dr. Abel Murillo only. Advised Dr. Abel Murillo has no availability today. She refused offer to be seen by another provider and insisted to see Dr. Abel Murillo. Advised patient to go to Urgent/ED for evaluation and treatment if it was her choice to not be seen by another provider. Patient started to curse and yell at this writer. Reminded patient that I was trying to help her. Patient was very displeased and apologetic for cursing at this writer. Again advised patient not to delay in care and to go to be evaluated by Urgent/ED. Patient was very upset and stated \"I'll figure something out. \" Advised if she were to change her mind to please call back.

## 2023-05-01 NOTE — ED TRIAGE NOTES
Patient reports she started with what she though was a herpes break out in her left groin so she took her medication per usual- reports the small spot has increased in her left groin and is now larger and painful with touch    Patient denies any redness or drainage.

## 2023-05-01 NOTE — ED PROVIDER NOTES
Please note that this dictation was completed with ISC8, the computer voice recognition software. Quite often unanticipated grammatical, syntax, homophones, and other interpretive errors are inadvertently transcribed by the computer software. Please disregard these errors. Please excuse any errors that have escaped final proofreading. Patient is a 49-year-old female with history of anxiety, HSV, palpitations, presenting to ED for evaluation of painful red area to her left groin which was first noticed 3 days ago. She states that initially she thought this was a herpes outbreak, which she gets periodically, but she took her typical prophylaxis and then not noticed any improvement. She states that the lesion looks different than her typical herpes lesions. She attempted to see her PCP was unable to see her in the office. She denies fever, chills, nausea, vomiting, urinary changes, vaginal discharge, or any additional medical complaints at this time. Past Medical History:   Diagnosis Date    Anxiety     Dr. Parvez Worthington. started zoloft 2020, buspar 2020. .  consulted MD in [facility][note 3] SERV and took xanax prn    Depression     Dr. Parvez Worthington    Early menopause 2019    age 37. dx in [place]    Elevated liver enzymes 2019    resolved. consulted GI 2019. US normal    Esophageal stricture     s/p eophageal dilation 2021 Dr. Omayra Diallo. pylori infection     dx by stool test.  Dr. Sundeep Arroyo. failed prevpak, then did not tolerate Pylera. HSV (herpes simplex virus) infection     dx with hsv-1, hsv-2    Keratosis pilaris     legs    Palpitations     benign tachycardia. consulted EP/card in [place]  (echo normal) and [place] 2020 (48 hour holter ok)        Past Surgical History:   Procedure Laterality Date    HX  SECTION  2015    HX COLONOSCOPY  2020    normal. Dr. Samuel Ahuja (in chart)    HX ENDOSCOPY  2020    mild distal esophagitis. Dr. Samuel Ahuja. moderate eosphinphils.  tx 8 weeks PPI.  neg. H pylori    HX ENDOSCOPY  02/2021    esophageal dilation. Dr. Leslee Akbar      has one remaining ovary    IR ESOPHAGOSCOPY W/BALLOON  02/2021         Family History:   Problem Relation Age of Onset    Hypertension Mother     Pulmonary Embolism Mother     Thyroid Disease Mother     High Cholesterol Mother     Schizophrenia Maternal Aunt     Colon Cancer Maternal Aunt     Heart Attack Maternal Grandmother        Social History     Socioeconomic History    Marital status: SINGLE     Spouse name: 0    Number of children: 1    Years of education: Not on file    Highest education level: Not on file   Occupational History    Not on file   Tobacco Use    Smoking status: Never    Smokeless tobacco: Never   Substance and Sexual Activity    Alcohol use: Never    Drug use: Never    Sexual activity: Not on file   Other Topics Concern    Not on file   Social History Narrative    11 year -old son 5/2020     Social Determinants of Health     Financial Resource Strain: Not on file   Food Insecurity: Not on file   Transportation Needs: Not on file   Physical Activity: Not on file   Stress: Not on file   Social Connections: Not on file   Intimate Partner Violence: Not on file   Housing Stability: Not on file         ALLERGIES: Patient has no known allergies. Review of Systems   Constitutional:  Negative for fever. HENT:  Negative for congestion and sore throat. Eyes:  Negative for visual disturbance. Respiratory:  Negative for cough and shortness of breath. Cardiovascular:  Negative for chest pain. Gastrointestinal:  Negative for abdominal pain, diarrhea, nausea and vomiting. Genitourinary:  Negative for dysuria. Musculoskeletal:  Negative for back pain. Skin:  Positive for color change and wound. Neurological:  Negative for dizziness and headaches. Psychiatric/Behavioral:  Negative for confusion.       Vitals:    05/01/23 1158   BP: 117/69   Pulse: (!) 103   Resp: 16 Temp: 99.5 °F (37.5 °C)   SpO2: 99%   Weight: 72.6 kg (160 lb)   Height: 5' 7\" (1.702 m)            Physical Exam  Vitals and nursing note reviewed. Constitutional:       General: She is not in acute distress. Appearance: Normal appearance. She is not ill-appearing. HENT:      Head: Normocephalic and atraumatic. Eyes:      General: Vision grossly intact. Extraocular Movements: Extraocular movements intact. Conjunctiva/sclera: Conjunctivae normal.   Neck:      Trachea: Phonation normal.   Cardiovascular:      Rate and Rhythm: Normal rate and regular rhythm. Heart sounds: Normal heart sounds. Pulmonary:      Effort: Pulmonary effort is normal.      Breath sounds: Normal breath sounds. Abdominal:      Palpations: Abdomen is soft. Tenderness: There is no abdominal tenderness. Genitourinary:     Labia:         Left: Lesion (Tenderness to the left labia majora with central small area of induration, no purulence or fluctuance. Tender. No streaking.) present. Musculoskeletal:         General: Normal range of motion. Cervical back: Normal range of motion. Skin:     General: Skin is warm and dry. Neurological:      General: No focal deficit present. Mental Status: She is alert and oriented to person, place, and time. Medical Decision Making  This is a 49-year-old female who presents to the emergency room ambulatory with stable vitals signs with complaints of painful lesion to her left groin. I personally reviewed any available previous notes and chronic medical issues  On physical exam she appears to have a small area of induration, tenderness, swelling to her left labia consistent with likely folliculitis. No fluctuance or signs of large abscess requiring I&D at this time. No vesicles or signs of HSV outbreak. No systemic signs of illness. Interventions and Plan:  We will start patient on topical and p.o. antibiotics and recommend warm compresses, and follow-up with her PCP for wound check. Return precautions outlined. Questions answered at this time. Risk  Prescription drug management. 12:54 PM  Pt has been reevaluated. There are no new complaints, changes, or physical findings at this time. All results have been reviewed with patient and/or family. Medications have been reviewed w/ pt and/or family. Pt and/or family's questions have been answered. Pt and/or family expressed good understanding of the dx/tx/rx and is in agreement with plan of care. Pt instructed and agreed to f/u w/ PCP and to return to ED upon further deterioration. Return precautions outlined. All questions answered at this time. Pt is stable and ready for discharge. IMPRESSION:  1. Acute folliculitis        PLAN:  1. Current Discharge Medication List        START taking these medications    Details   clindamycin (CLINDAGEL) 1 % topical gel Apply  to affected area two (2) times a day. use thin film on affected area  Qty: 30 g, Refills: 0  Start date: 5/1/2023      cephALEXin (Keflex) 500 mg capsule Take 1 Capsule by mouth four (4) times daily for 7 days. Qty: 28 Capsule, Refills: 0  Start date: 5/1/2023, End date: 5/8/2023           2.    Follow-up Information       Follow up With Specialties Details Why Contact Info    Juliet Farrell MD Internal Medicine Physician Go to  As needed, For wound re-check [address] [phone]      [facility] EMERGENCY DEPT Emergency Medicine Go to  If symptoms worsen [address] [facility]  [phone]              Return to ED if worse     Procedures

## 2023-05-03 ENCOUNTER — OFFICE VISIT (OUTPATIENT)
Dept: INTERNAL MEDICINE CLINIC | Age: 48
End: 2023-05-03
Payer: MEDICAID

## 2023-05-03 VITALS
SYSTOLIC BLOOD PRESSURE: 95 MMHG | BODY MASS INDEX: 25.65 KG/M2 | TEMPERATURE: 97.7 F | RESPIRATION RATE: 16 BRPM | HEART RATE: 77 BPM | WEIGHT: 163.4 LBS | DIASTOLIC BLOOD PRESSURE: 59 MMHG | OXYGEN SATURATION: 98 % | HEIGHT: 67 IN

## 2023-05-03 DIAGNOSIS — L73.9 FOLLICULITIS: Primary | ICD-10-CM

## 2023-05-03 PROCEDURE — 99213 OFFICE O/P EST LOW 20 MIN: CPT | Performed by: NURSE PRACTITIONER

## 2023-05-03 RX ORDER — SULFAMETHOXAZOLE AND TRIMETHOPRIM 800; 160 MG/1; MG/1
1 TABLET ORAL 2 TIMES DAILY
Qty: 14 TABLET | Refills: 0 | Status: SHIPPED | OUTPATIENT
Start: 2023-05-03 | End: 2023-05-10

## 2023-05-17 RX ORDER — CLINDAMYCIN PHOSPHATE 10 MG/G
GEL TOPICAL 2 TIMES DAILY
COMMUNITY
Start: 2023-05-01

## 2023-05-17 RX ORDER — LACTULOSE 10 G/15ML
45 SOLUTION ORAL 3 TIMES DAILY PRN
COMMUNITY
Start: 2023-02-10

## 2023-05-17 RX ORDER — FLUTICASONE PROPIONATE 50 MCG
2 SPRAY, SUSPENSION (ML) NASAL PRN
COMMUNITY

## 2023-05-17 RX ORDER — VALACYCLOVIR HYDROCHLORIDE 500 MG/1
500 TABLET, FILM COATED ORAL 2 TIMES DAILY PRN
COMMUNITY
Start: 2023-02-10

## 2023-05-17 RX ORDER — BUSPIRONE HYDROCHLORIDE 15 MG/1
1 TABLET ORAL 2 TIMES DAILY
COMMUNITY
Start: 2023-03-01

## 2023-05-25 ENCOUNTER — TELEPHONE (OUTPATIENT)
Age: 48
End: 2023-05-25

## 2023-05-25 NOTE — TELEPHONE ENCOUNTER
Spoke with patient and provided referrals for OB/GYN : [address] [facility]  [phone] and [facility] [phone]. She states understanding and requests a follow up appt with MARILEE Farris for her Folliculitis improved but has not cleared up entirely and se now has a new one developing. Scheduled for 5/26/23 at 2:20 PM.  Grateful for the call.

## 2023-05-25 NOTE — TELEPHONE ENCOUNTER
----- Message from Arnold Travis sent at 5/23/2023  1:23 PM EDT -----  Subject: Referral Request    Reason for referral request? pt needs referral to gynocology that takes   her medicaid ins for mammo and all the stuff  Provider patient wants to be referred to(if known):     Provider Phone Number(if known):     Additional Information for Provider?   ---------------------------------------------------------------------------  --------------  [id number]    [phone]; OK to leave message on voicemail  ---------------------------------------------------------------------------  --------------

## 2023-05-26 ENCOUNTER — OFFICE VISIT (OUTPATIENT)
Age: 48
End: 2023-05-26
Payer: COMMERCIAL

## 2023-05-26 VITALS
DIASTOLIC BLOOD PRESSURE: 73 MMHG | OXYGEN SATURATION: 99 % | RESPIRATION RATE: 16 BRPM | HEIGHT: 67 IN | TEMPERATURE: 97.5 F | WEIGHT: 166.2 LBS | SYSTOLIC BLOOD PRESSURE: 117 MMHG | HEART RATE: 73 BPM | BODY MASS INDEX: 26.09 KG/M2

## 2023-05-26 DIAGNOSIS — L73.9 FOLLICULITIS: Primary | ICD-10-CM

## 2023-05-26 PROCEDURE — 99213 OFFICE O/P EST LOW 20 MIN: CPT | Performed by: NURSE PRACTITIONER

## 2023-05-26 RX ORDER — SULFAMETHOXAZOLE AND TRIMETHOPRIM 800; 160 MG/1; MG/1
1 TABLET ORAL 2 TIMES DAILY
COMMUNITY

## 2023-05-26 ASSESSMENT — PATIENT HEALTH QUESTIONNAIRE - PHQ9
SUM OF ALL RESPONSES TO PHQ9 QUESTIONS 1 & 2: 0
SUM OF ALL RESPONSES TO PHQ QUESTIONS 1-9: 0
1. LITTLE INTEREST OR PLEASURE IN DOING THINGS: 0
SUM OF ALL RESPONSES TO PHQ QUESTIONS 1-9: 0
2. FEELING DOWN, DEPRESSED OR HOPELESS: 0

## 2023-05-28 LAB
BASOPHILS # BLD AUTO: 0 X10E3/UL (ref 0–0.2)
BASOPHILS NFR BLD AUTO: 1 %
BUN SERPL-MCNC: 13 MG/DL (ref 6–24)
BUN/CREAT SERPL: 15 (ref 9–23)
CALCIUM SERPL-MCNC: 10 MG/DL (ref 8.7–10.2)
CHLORIDE SERPL-SCNC: 102 MMOL/L (ref 96–106)
CO2 SERPL-SCNC: 22 MMOL/L (ref 20–29)
CREAT SERPL-MCNC: 0.89 MG/DL (ref 0.57–1)
EGFRCR SERPLBLD CKD-EPI 2021: 80 ML/MIN/1.73
EOSINOPHIL # BLD AUTO: 0.1 X10E3/UL (ref 0–0.4)
EOSINOPHIL NFR BLD AUTO: 2 %
ERYTHROCYTE [DISTWIDTH] IN BLOOD BY AUTOMATED COUNT: 13.6 % (ref 11.7–15.4)
GLUCOSE SERPL-MCNC: 117 MG/DL (ref 70–99)
HCT VFR BLD AUTO: 39.8 % (ref 34–46.6)
HGB BLD-MCNC: 13 G/DL (ref 11.1–15.9)
IMM GRANULOCYTES # BLD AUTO: 0 X10E3/UL (ref 0–0.1)
IMM GRANULOCYTES NFR BLD AUTO: 0 %
LYMPHOCYTES # BLD AUTO: 1.8 X10E3/UL (ref 0.7–3.1)
LYMPHOCYTES NFR BLD AUTO: 34 %
MCH RBC QN AUTO: 29 PG (ref 26.6–33)
MCHC RBC AUTO-ENTMCNC: 32.7 G/DL (ref 31.5–35.7)
MCV RBC AUTO: 89 FL (ref 79–97)
MONOCYTES # BLD AUTO: 0.5 X10E3/UL (ref 0.1–0.9)
MONOCYTES NFR BLD AUTO: 9 %
NEUTROPHILS # BLD AUTO: 3 X10E3/UL (ref 1.4–7)
NEUTROPHILS NFR BLD AUTO: 54 %
PLATELET # BLD AUTO: 242 X10E3/UL (ref 150–450)
POTASSIUM SERPL-SCNC: 3.8 MMOL/L (ref 3.5–5.2)
RBC # BLD AUTO: 4.49 X10E6/UL (ref 3.77–5.28)
SODIUM SERPL-SCNC: 140 MMOL/L (ref 134–144)
WBC # BLD AUTO: 5.4 X10E3/UL (ref 3.4–10.8)

## 2023-05-29 ASSESSMENT — ENCOUNTER SYMPTOMS
EYES NEGATIVE: 1
CHEST TIGHTNESS: 0
COUGH: 0
VOMITING: 0
ABDOMINAL PAIN: 0
EYE REDNESS: 0
SINUS PAIN: 0
SHORTNESS OF BREATH: 0
GASTROINTESTINAL NEGATIVE: 1
COLOR CHANGE: 0
RHINORRHEA: 0
BLOOD IN STOOL: 0
DIARRHEA: 0
BACK PAIN: 0
RESPIRATORY NEGATIVE: 1
SINUS PRESSURE: 0
NAUSEA: 0
EYE PAIN: 0
CONSTIPATION: 0

## 2023-08-04 ENCOUNTER — OFFICE VISIT (OUTPATIENT)
Age: 48
End: 2023-08-04
Payer: COMMERCIAL

## 2023-08-04 VITALS
WEIGHT: 167 LBS | DIASTOLIC BLOOD PRESSURE: 72 MMHG | HEART RATE: 80 BPM | SYSTOLIC BLOOD PRESSURE: 113 MMHG | BODY MASS INDEX: 26.16 KG/M2

## 2023-08-04 DIAGNOSIS — Z01.419 ENCOUNTER FOR WELL WOMAN EXAM: Primary | ICD-10-CM

## 2023-08-04 DIAGNOSIS — Z12.4 SCREENING FOR CERVICAL CANCER: ICD-10-CM

## 2023-08-04 PROCEDURE — 99386 PREV VISIT NEW AGE 40-64: CPT | Performed by: OBSTETRICS & GYNECOLOGY

## 2023-08-04 NOTE — PROGRESS NOTES
Orin Calixto is a 50 y.o. female returns for an annual exam     Chief Complaint   Patient presents with    Annual Exam       No LMP recorded. (Menstrual status: Menopause). Her periods are nonexistent in flow. Problems: no problems  Birth Control: abstinence. Last Pap: normal obtained she thinks a couple year(s) ago. She does not have a history of JOVANNY 2, 3 or cervical cancer per pt. Last Mammogram: had her mammogram today in our office.

## 2023-08-04 NOTE — PROGRESS NOTES
Per Nursing Note:  Salina Hollingsworth is a 50 y.o. female returns for an annual exam          Chief Complaint   Patient presents with    Annual Exam         No LMP recorded. (Menstrual status: Menopause). Her periods are nonexistent in flow. Problems: no problems  Birth Control: abstinence. Last Pap: normal obtained she thinks a couple year(s) ago. She does not have a history of JOVANNY 2, 3 or cervical cancer per pt. Last Mammogram: had her mammogram today in our office. Annual exam  (New patient)      Salina Hollingsworth is a [id number],  50 y.o. female   No LMP recorded. (Menstrual status: Menopause). She presents for her annual checkup. She  is not having gyn problems. HSV-1 and -2. Valtrex prn. Gets RX through her PCP. Menstrual status:    Menopause at [age]yo. Never took HRT  +vasomotor sx, improved. Sexual history:    She  reports that she is not currently sexually active and has had partner(s) who are male. Pap Smear:  Her most recent Pap smear was normal HPV was negative, obtained . (Van)  She does not have a history of abnormal paps. Past Medical History:   Diagnosis Date    Anxiety     Dr. Khoa Lawson. started zoloft 2020, buspar 2020. .  consulted MD in [facility] and took xanax prn    Depression     Dr. Khoa Lawson    Early menopause 2019    age 37. dx in [place]    Elevated liver enzymes 2019    resolved. consulted GI 2019. US normal    Esophageal stricture     s/p eophageal dilation 2021 Dr. Nelly Cali. pylori infection     dx by stool test.  Dr. Alyssa Carrasco. failed prevpak, then did not tolerate Pylera. HSV (herpes simplex virus) infection     dx with hsv-1, hsv-2    Keratosis pilaris     legs    Palpitations     benign tachycardia.  consulted EP/card in [facility]  (echo normal) and [place] 2020 (48 hour holter ok)     Rh incompatibility      Past Surgical History:   Procedure Laterality Date     SECTION

## 2023-08-09 LAB
CYTOLOGIST CVX/VAG CYTO: NORMAL
CYTOLOGY CVX/VAG DOC CYTO: NORMAL
CYTOLOGY CVX/VAG DOC THIN PREP: NORMAL
DX ICD CODE: NORMAL
HPV GENOTYPE REFLEX: NORMAL
HPV I/H RISK 4 DNA CVX QL PROBE+SIG AMP: NEGATIVE
Lab: NORMAL
OTHER STN SPEC: NORMAL
STAT OF ADQ CVX/VAG CYTO-IMP: NORMAL

## 2023-09-11 NOTE — TELEPHONE ENCOUNTER
Letter drafted and is in [address] [place] [address].   I signed a copy as well Unique Flap 3 Text: Because of the through-and-through defect of the lateral ala, in order to restore the nose and maintain an open airway, a island-pedicle Spear flap was planned with cheek advancement flap.  After prep and anesthesia, a template was made of the right side of the lip and transferred to the left side to outline the normal anatomic position of the triangular portion of the upper lip.  A template was then made of the lining and outer defect of the left ala and transferred to the medial cheek adjacent to the nasolabial fold.  An island-pedicle flap was incised and elevated and carefully dissected to a muscular subcutaneous pedicle based near the piriform aperture and ascending portion of the maxilla.  This was carefully turned over and sutured to line the ala, then turned up on itself where it was sutured in a layered fashion.  \\n\\nTo close the donor site defect, a cheek flap was elevated by undermining in the subcutaneous plane lateral to the lateral canthus  After hemostasis was obtained the flap was advanced medially, attached to the piriform aperture of the axilla and ascending portion of the maxilla, and then closed in a layered fashion.  The advancement flap measured 3 x 4 cm.

## 2023-10-21 DIAGNOSIS — F41.9 ANXIETY DISORDER, UNSPECIFIED: ICD-10-CM

## 2023-10-24 ENCOUNTER — TELEPHONE (OUTPATIENT)
Age: 48
End: 2023-10-24

## 2023-10-24 DIAGNOSIS — F41.9 ANXIETY: Primary | ICD-10-CM

## 2023-10-24 RX ORDER — BUSPIRONE HYDROCHLORIDE 15 MG/1
15 TABLET ORAL 2 TIMES DAILY
Qty: 180 TABLET | Refills: 1 | Status: SHIPPED | OUTPATIENT
Start: 2023-10-24

## 2023-10-24 NOTE — TELEPHONE ENCOUNTER
Medication refill     busPIRone (BUSPAR) 15 MG tablet     [facility]/pharmacy #[id number] - P [phone] - F [phone]

## 2023-11-10 ENCOUNTER — TELEPHONE (OUTPATIENT)
Age: 48
End: 2023-11-10

## 2023-11-10 ENCOUNTER — OFFICE VISIT (OUTPATIENT)
Age: 48
End: 2023-11-10

## 2023-11-10 VITALS
BODY MASS INDEX: 27.03 KG/M2 | SYSTOLIC BLOOD PRESSURE: 130 MMHG | DIASTOLIC BLOOD PRESSURE: 76 MMHG | WEIGHT: 172.6 LBS | RESPIRATION RATE: 20 BRPM | OXYGEN SATURATION: 98 % | TEMPERATURE: 98.3 F | HEART RATE: 75 BPM

## 2023-11-10 DIAGNOSIS — J40 BRONCHITIS: Primary | ICD-10-CM

## 2023-11-10 RX ORDER — PREDNISONE 20 MG/1
20 TABLET ORAL 2 TIMES DAILY
Qty: 10 TABLET | Refills: 0 | Status: SHIPPED | OUTPATIENT
Start: 2023-11-10 | End: 2023-11-15

## 2023-11-10 RX ORDER — DEXTROMETHORPHAN HYDROBROMIDE AND PROMETHAZINE HYDROCHLORIDE 15; 6.25 MG/5ML; MG/5ML
5 SYRUP ORAL
Qty: 118 ML | Refills: 0 | Status: SHIPPED | OUTPATIENT
Start: 2023-11-10

## 2023-11-10 RX ORDER — BENZONATATE 200 MG/1
200 CAPSULE ORAL 3 TIMES DAILY PRN
Qty: 30 CAPSULE | Refills: 0 | Status: SHIPPED | OUTPATIENT
Start: 2023-11-10 | End: 2023-11-17

## 2023-11-10 NOTE — TELEPHONE ENCOUNTER
RTC to patient regarding a cough to advise to go to Urgent Care for evaluation and treatment due to providers are gone for the day. No answer and LVM.

## 2023-11-10 NOTE — TELEPHONE ENCOUNTER
Patient says she has a barking cough and would like some medication sent to her pharmacy     [facility]/pharmacy #[id number]     Neena Casillas [phone]

## 2023-12-08 ENCOUNTER — TELEPHONE (OUTPATIENT)
Age: 48
End: 2023-12-08

## 2023-12-08 DIAGNOSIS — J20.9 ACUTE BRONCHITIS, UNSPECIFIED ORGANISM: Primary | ICD-10-CM

## 2023-12-08 RX ORDER — AZITHROMYCIN 250 MG/1
250 TABLET, FILM COATED ORAL SEE ADMIN INSTRUCTIONS
Qty: 6 TABLET | Refills: 0 | Status: SHIPPED | OUTPATIENT
Start: 2023-12-08 | End: 2023-12-13

## 2023-12-08 NOTE — TELEPHONE ENCOUNTER
Spoke with patient and she reports she is having a Chest fullness with cough and congestion. She was DX at [facility] on 11/10/23  with Bronchitis and prescribed Medrol dose selma and Tessalon- no antibiotic. She barely improved with that. She went to [facility] 11/25 or 11/26 and they did a chest xray. She was prescribed a albuterol inhaler but not effective. She has not been prescribed any antibiotics. Current symptoms of cough and chest congestion persists. No fever. Intermittent stomach ache. Unable to sleep due to cough. She did cough up a lrg amount of thick deep yellow mucus last night. She is not feeling well. She is asking for a same day appointment. Advised none available. She is reluctant to go back to Urgent care and asks if Dr. Lorena Rogers can call something in for her. Grateful for the call.  Requested record from [facility].

## 2023-12-08 NOTE — TELEPHONE ENCOUNTER
Spoke with patient and updated on Dr. Stevens Distance comment and script sent to her pharmacy for Z-Emeka antibiotic. She is also advised to drink plenty of water. She states understanding and grateful for the call.

## 2023-12-12 ENCOUNTER — APPOINTMENT (OUTPATIENT)
Facility: HOSPITAL | Age: 48
End: 2023-12-12
Payer: COMMERCIAL

## 2023-12-12 ENCOUNTER — HOSPITAL ENCOUNTER (EMERGENCY)
Facility: HOSPITAL | Age: 48
Discharge: HOME | End: 2023-12-12
Attending: EMERGENCY MEDICINE
Payer: COMMERCIAL

## 2023-12-12 VITALS
HEART RATE: 88 BPM | HEIGHT: 67 IN | DIASTOLIC BLOOD PRESSURE: 83 MMHG | WEIGHT: 163 LBS | OXYGEN SATURATION: 98 % | TEMPERATURE: 98.1 F | RESPIRATION RATE: 20 BRPM | BODY MASS INDEX: 25.58 KG/M2 | SYSTOLIC BLOOD PRESSURE: 127 MMHG

## 2023-12-12 DIAGNOSIS — E86.0 DEHYDRATION: ICD-10-CM

## 2023-12-12 DIAGNOSIS — J06.9 VIRAL URI: Primary | ICD-10-CM

## 2023-12-12 LAB
ALBUMIN SERPL-MCNC: 3.6 G/DL (ref 3.5–5)
ALBUMIN/GLOB SERPL: 0.9 (ref 1.1–2.2)
ALP SERPL-CCNC: 100 U/L (ref 45–117)
ALT SERPL-CCNC: 25 U/L (ref 12–78)
ANION GAP SERPL CALC-SCNC: 4 MMOL/L (ref 5–15)
AST SERPL-CCNC: 17 U/L (ref 15–37)
BASOPHILS # BLD: 0 K/UL (ref 0–0.1)
BASOPHILS NFR BLD: 1 % (ref 0–1)
BILIRUB SERPL-MCNC: 0.2 MG/DL (ref 0.2–1)
BUN SERPL-MCNC: 18 MG/DL (ref 6–20)
BUN/CREAT SERPL: 15 (ref 12–20)
CALCIUM SERPL-MCNC: 8.9 MG/DL (ref 8.5–10.1)
CHLORIDE SERPL-SCNC: 108 MMOL/L (ref 97–108)
CO2 SERPL-SCNC: 26 MMOL/L (ref 21–32)
CREAT SERPL-MCNC: 1.18 MG/DL (ref 0.55–1.02)
DIFFERENTIAL METHOD BLD: NORMAL
EOSINOPHIL # BLD: 0.1 K/UL (ref 0–0.4)
EOSINOPHIL NFR BLD: 3 % (ref 0–7)
ERYTHROCYTE [DISTWIDTH] IN BLOOD BY AUTOMATED COUNT: 13.7 % (ref 11.5–14.5)
GLOBULIN SER CALC-MCNC: 4.2 G/DL (ref 2–4)
GLUCOSE SERPL-MCNC: 95 MG/DL (ref 65–100)
HCT VFR BLD AUTO: 37.8 % (ref 35–47)
HGB BLD-MCNC: 12.7 G/DL (ref 11.5–16)
IMM GRANULOCYTES # BLD AUTO: 0 K/UL (ref 0–0.04)
IMM GRANULOCYTES NFR BLD AUTO: 0 % (ref 0–0.5)
LYMPHOCYTES # BLD: 2.3 K/UL (ref 0.8–3.5)
LYMPHOCYTES NFR BLD: 44 % (ref 12–49)
MCH RBC QN AUTO: 29.3 PG (ref 26–34)
MCHC RBC AUTO-ENTMCNC: 33.6 G/DL (ref 30–36.5)
MCV RBC AUTO: 87.3 FL (ref 80–99)
MONOCYTES # BLD: 0.6 K/UL (ref 0–1)
MONOCYTES NFR BLD: 11 % (ref 5–13)
NEUTS SEG # BLD: 2.2 K/UL (ref 1.8–8)
NEUTS SEG NFR BLD: 41 % (ref 32–75)
NRBC # BLD: 0 K/UL (ref 0–0.01)
NRBC BLD-RTO: 0 PER 100 WBC
PLATELET # BLD AUTO: 247 K/UL (ref 150–400)
PMV BLD AUTO: 9.5 FL (ref 8.9–12.9)
POTASSIUM SERPL-SCNC: 4.5 MMOL/L (ref 3.5–5.1)
PROT SERPL-MCNC: 7.8 G/DL (ref 6.4–8.2)
RBC # BLD AUTO: 4.33 M/UL (ref 3.8–5.2)
SODIUM SERPL-SCNC: 138 MMOL/L (ref 136–145)
WBC # BLD AUTO: 5.3 K/UL (ref 3.6–11)

## 2023-12-12 PROCEDURE — 96360 HYDRATION IV INFUSION INIT: CPT

## 2023-12-12 PROCEDURE — 99284 EMERGENCY DEPT VISIT MOD MDM: CPT

## 2023-12-12 PROCEDURE — 6370000000 HC RX 637 (ALT 250 FOR IP): Performed by: STUDENT IN AN ORGANIZED HEALTH CARE EDUCATION/TRAINING PROGRAM

## 2023-12-12 PROCEDURE — 36415 COLL VENOUS BLD VENIPUNCTURE: CPT

## 2023-12-12 PROCEDURE — 80053 COMPREHEN METABOLIC PANEL: CPT

## 2023-12-12 PROCEDURE — 71046 X-RAY EXAM CHEST 2 VIEWS: CPT

## 2023-12-12 PROCEDURE — 2580000003 HC RX 258: Performed by: STUDENT IN AN ORGANIZED HEALTH CARE EDUCATION/TRAINING PROGRAM

## 2023-12-12 PROCEDURE — 6360000002 HC RX W HCPCS: Performed by: STUDENT IN AN ORGANIZED HEALTH CARE EDUCATION/TRAINING PROGRAM

## 2023-12-12 PROCEDURE — 85025 COMPLETE CBC W/AUTO DIFF WBC: CPT

## 2023-12-12 RX ORDER — BENZONATATE 200 MG/1
200 CAPSULE ORAL 3 TIMES DAILY PRN
Qty: 30 CAPSULE | Refills: 0 | Status: SHIPPED | OUTPATIENT
Start: 2023-12-12

## 2023-12-12 RX ORDER — 0.9 % SODIUM CHLORIDE 0.9 %
1000 INTRAVENOUS SOLUTION INTRAVENOUS ONCE
Status: COMPLETED | OUTPATIENT
Start: 2023-12-12 | End: 2023-12-12

## 2023-12-12 RX ORDER — IPRATROPIUM BROMIDE AND ALBUTEROL SULFATE 2.5; .5 MG/3ML; MG/3ML
1 SOLUTION RESPIRATORY (INHALATION)
Status: COMPLETED | OUTPATIENT
Start: 2023-12-12 | End: 2023-12-12

## 2023-12-12 RX ORDER — ALBUTEROL SULFATE 2.5 MG/3ML
2.5 SOLUTION RESPIRATORY (INHALATION) ONCE
Status: COMPLETED | OUTPATIENT
Start: 2023-12-12 | End: 2023-12-12

## 2023-12-12 RX ADMIN — ALBUTEROL SULFATE 2.5 MG: 2.5 SOLUTION RESPIRATORY (INHALATION) at 19:31

## 2023-12-12 RX ADMIN — SODIUM CHLORIDE 1000 ML: 9 INJECTION, SOLUTION INTRAVENOUS at 19:53

## 2023-12-12 RX ADMIN — IPRATROPIUM BROMIDE AND ALBUTEROL SULFATE 1 DOSE: .5; 3 SOLUTION RESPIRATORY (INHALATION) at 19:31

## 2023-12-12 ASSESSMENT — ENCOUNTER SYMPTOMS
ABDOMINAL PAIN: 0
EYE DISCHARGE: 0
SHORTNESS OF BREATH: 0
COUGH: 1

## 2023-12-12 ASSESSMENT — PAIN SCALES - GENERAL: PAINLEVEL_OUTOF10: 0

## 2023-12-12 ASSESSMENT — LIFESTYLE VARIABLES
HOW OFTEN DO YOU HAVE A DRINK CONTAINING ALCOHOL: NEVER
HOW MANY STANDARD DRINKS CONTAINING ALCOHOL DO YOU HAVE ON A TYPICAL DAY: PATIENT DOES NOT DRINK

## 2023-12-12 ASSESSMENT — PAIN - FUNCTIONAL ASSESSMENT: PAIN_FUNCTIONAL_ASSESSMENT: 0-10

## 2023-12-12 NOTE — ED TRIAGE NOTES
Patient here with complaint of fullness between breast bone, triggered by cough, has been worked up for this in the past, placed on steroids, cough medicine. Symptoms ongoing for about a month. Patient reports that her head feel heavy when she walks. Currently on last dose of z-selma prescribed at [facility], states chest xray at [facility] shows that her lungs are\"enlarged\" and was given an inhaler without any relief. Denies fever, chills, SOB or NVD.

## 2023-12-13 NOTE — ED NOTES
Discharge instructions reviewed with patient who verbalized understanding.       Mukesh Roldan RN  12/12/23 0885

## 2023-12-27 ENCOUNTER — TELEPHONE (OUTPATIENT)
Age: 48
End: 2023-12-27

## 2023-12-27 NOTE — TELEPHONE ENCOUNTER
Patient called stating she has a medical form she needs filled out by the Dr. she isn't sure if she needs a physical or not. She said this paper is due now. Call patient back to clarify what needs to be done about this form. She did state she would come up here but I advised patient to wait for the nurses call on what to do.      Call Gopi Fox [phone]

## 2023-12-27 NOTE — TELEPHONE ENCOUNTER
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 She is requesting an appt to have a form completed that is required for her to start on the 10 th. Scheduled for 12/29/23 @0940 AM.  Grateful for the call.

## 2023-12-29 ENCOUNTER — OFFICE VISIT (OUTPATIENT)
Age: 48
End: 2023-12-29
Payer: COMMERCIAL

## 2023-12-29 VITALS
SYSTOLIC BLOOD PRESSURE: 115 MMHG | TEMPERATURE: 98 F | BODY MASS INDEX: 26.37 KG/M2 | DIASTOLIC BLOOD PRESSURE: 76 MMHG | WEIGHT: 168 LBS | HEIGHT: 67 IN | OXYGEN SATURATION: 96 % | HEART RATE: 73 BPM

## 2023-12-29 DIAGNOSIS — Z11.1 SCREENING FOR TUBERCULOSIS: ICD-10-CM

## 2023-12-29 DIAGNOSIS — F41.9 ANXIETY: ICD-10-CM

## 2023-12-29 DIAGNOSIS — Z20.828 EXPOSURE TO INFLUENZA: ICD-10-CM

## 2023-12-29 DIAGNOSIS — Z28.21 COVID-19 VACCINATION DECLINED: ICD-10-CM

## 2023-12-29 DIAGNOSIS — Z13.9 SCREENING DUE: ICD-10-CM

## 2023-12-29 DIAGNOSIS — Z11.59 NEED FOR HEPATITIS B SCREENING TEST: ICD-10-CM

## 2023-12-29 DIAGNOSIS — K59.00 CONSTIPATION, UNSPECIFIED CONSTIPATION TYPE: Primary | ICD-10-CM

## 2023-12-29 DIAGNOSIS — Z11.59 MEASLES SCREENING: ICD-10-CM

## 2023-12-29 DIAGNOSIS — E86.0 DEHYDRATION: ICD-10-CM

## 2023-12-29 DIAGNOSIS — J45.20 RAD (REACTIVE AIRWAY DISEASE) WITH WHEEZING, MILD INTERMITTENT, UNCOMPLICATED: ICD-10-CM

## 2023-12-29 LAB
ANION GAP SERPL CALC-SCNC: 2 MMOL/L (ref 5–15)
BUN SERPL-MCNC: 14 MG/DL (ref 6–20)
BUN/CREAT SERPL: 18 (ref 12–20)
CALCIUM SERPL-MCNC: 9.1 MG/DL (ref 8.5–10.1)
CHLORIDE SERPL-SCNC: 109 MMOL/L (ref 97–108)
CO2 SERPL-SCNC: 29 MMOL/L (ref 21–32)
CREAT SERPL-MCNC: 0.79 MG/DL (ref 0.55–1.02)
GLUCOSE SERPL-MCNC: 83 MG/DL (ref 65–100)
HBV SURFACE AB SER QL: REACTIVE
HBV SURFACE AB SER-ACNC: 12.39 MIU/ML
HBV SURFACE AG SER QL: <0.1 INDEX
HBV SURFACE AG SER QL: NEGATIVE
POTASSIUM SERPL-SCNC: 4.3 MMOL/L (ref 3.5–5.1)
SODIUM SERPL-SCNC: 140 MMOL/L (ref 136–145)

## 2023-12-29 PROCEDURE — 99213 OFFICE O/P EST LOW 20 MIN: CPT | Performed by: INTERNAL MEDICINE

## 2023-12-29 RX ORDER — OSELTAMIVIR PHOSPHATE 75 MG/1
75 CAPSULE ORAL 2 TIMES DAILY
Qty: 10 CAPSULE | Refills: 0 | Status: SHIPPED | OUTPATIENT
Start: 2023-12-29 | End: 2024-01-03

## 2023-12-29 RX ORDER — LACTULOSE 10 G/15ML
20 SOLUTION ORAL EVERY EVENING
Qty: 437 ML | Refills: 1 | Status: SHIPPED | OUTPATIENT
Start: 2023-12-29

## 2023-12-29 RX ORDER — ALBUTEROL SULFATE 90 UG/1
AEROSOL, METERED RESPIRATORY (INHALATION)
COMMUNITY
Start: 2023-12-03

## 2023-12-29 RX ORDER — CLINDAMYCIN PHOSPHATE 10 MG/ML
SOLUTION TOPICAL
COMMUNITY
Start: 2023-12-10

## 2024-01-01 LAB
M TB IFN-G BLD-IMP: NEGATIVE
M TB IFN-G CD4+ T-CELLS BLD-ACNC: 0.06 IU/ML
M TBIFN-G CD4+ CD8+T-CELLS BLD-ACNC: 0.05 IU/ML
QUANTIFERON CRITERIA: NORMAL
QUANTIFERON MITOGEN VALUE: >10 IU/ML
QUANTIFERON NIL VALUE: 0.06 IU/ML

## 2024-01-02 LAB
MEV IGG SER IA-ACNC: 217 AU/ML
MUV IGG SER IA-ACNC: 162 AU/ML
RUBV IGG SERPL IA-ACNC: 14.7 INDEX
VZV IGG SER IA-ACNC: 1536 INDEX

## 2024-01-04 ENCOUNTER — TELEPHONE (OUTPATIENT)
Age: 49
End: 2024-01-04

## 2024-01-04 NOTE — TELEPHONE ENCOUNTER
T/C to patient regarding her forms and vaccinations for her [facility] admission requirements. No answer and LVM. [employer] message sent.

## 2024-01-05 DIAGNOSIS — K59.00 CONSTIPATION, UNSPECIFIED CONSTIPATION TYPE: ICD-10-CM

## 2024-01-05 RX ORDER — LACTULOSE 10 G/15ML
20 SOLUTION ORAL EVERY EVENING
Qty: 437 ML | Refills: 1 | OUTPATIENT
Start: 2024-01-05

## 2024-01-09 DIAGNOSIS — K59.00 CONSTIPATION, UNSPECIFIED CONSTIPATION TYPE: ICD-10-CM

## 2024-01-09 RX ORDER — LACTULOSE 10 G/15ML
20 SOLUTION ORAL EVERY EVENING
Qty: 437 ML | Refills: 1 | Status: SHIPPED | OUTPATIENT
Start: 2024-01-09 | End: 2024-01-12 | Stop reason: SDUPTHER

## 2024-01-12 DIAGNOSIS — K59.00 CONSTIPATION, UNSPECIFIED CONSTIPATION TYPE: ICD-10-CM

## 2024-01-12 RX ORDER — LACTULOSE 10 G/15ML
20 SOLUTION ORAL EVERY EVENING
Qty: 1311 ML | Refills: 1 | Status: SHIPPED | OUTPATIENT
Start: 2024-01-12

## 2024-02-02 ENCOUNTER — HOSPITAL ENCOUNTER (OUTPATIENT)
Facility: HOSPITAL | Age: 49
End: 2024-02-02
Payer: MEDICAID

## 2024-02-02 ENCOUNTER — OFFICE VISIT (OUTPATIENT)
Age: 49
End: 2024-02-02
Payer: MEDICAID

## 2024-02-02 VITALS
BODY MASS INDEX: 26.02 KG/M2 | TEMPERATURE: 97.8 F | DIASTOLIC BLOOD PRESSURE: 72 MMHG | HEART RATE: 81 BPM | HEIGHT: 67 IN | RESPIRATION RATE: 16 BRPM | WEIGHT: 165.8 LBS | OXYGEN SATURATION: 99 % | SYSTOLIC BLOOD PRESSURE: 116 MMHG

## 2024-02-02 DIAGNOSIS — S23.41XA SPRAIN OF COSTAL CARTILAGE, INITIAL ENCOUNTER: ICD-10-CM

## 2024-02-02 DIAGNOSIS — M79.675 GREAT TOE PAIN, LEFT: ICD-10-CM

## 2024-02-02 DIAGNOSIS — M79.675 GREAT TOE PAIN, LEFT: Primary | ICD-10-CM

## 2024-02-02 PROCEDURE — 99214 OFFICE O/P EST MOD 30 MIN: CPT | Performed by: NURSE PRACTITIONER

## 2024-02-02 PROCEDURE — 73630 X-RAY EXAM OF FOOT: CPT

## 2024-02-02 RX ORDER — NAPROXEN 250 MG/1
250 TABLET ORAL 2 TIMES DAILY WITH MEALS
Qty: 28 TABLET | Refills: 0 | Status: SHIPPED | OUTPATIENT
Start: 2024-02-02 | End: 2024-02-16

## 2024-02-02 ASSESSMENT — ENCOUNTER SYMPTOMS
DIARRHEA: 0
GASTROINTESTINAL NEGATIVE: 1
EYE REDNESS: 0
RHINORRHEA: 0
SINUS PRESSURE: 0
COUGH: 0
CONSTIPATION: 0
BLOOD IN STOOL: 0
ABDOMINAL PAIN: 0
EYE PAIN: 0
VOMITING: 0
EYES NEGATIVE: 1
SINUS PAIN: 0
NAUSEA: 0
SHORTNESS OF BREATH: 0
BACK PAIN: 0
RESPIRATORY NEGATIVE: 1
CHEST TIGHTNESS: 0

## 2024-02-02 ASSESSMENT — PATIENT HEALTH QUESTIONNAIRE - PHQ9
SUM OF ALL RESPONSES TO PHQ QUESTIONS 1-9: 0
SUM OF ALL RESPONSES TO PHQ QUESTIONS 1-9: 0
SUM OF ALL RESPONSES TO PHQ9 QUESTIONS 1 & 2: 0
SUM OF ALL RESPONSES TO PHQ QUESTIONS 1-9: 0
2. FEELING DOWN, DEPRESSED OR HOPELESS: 0
SUM OF ALL RESPONSES TO PHQ QUESTIONS 1-9: 0
1. LITTLE INTEREST OR PLEASURE IN DOING THINGS: 0

## 2024-02-02 NOTE — PROGRESS NOTES
Assessment and Plan     1. Great toe pain, left: Well-fitted shoes and in-soles recommended. Imaging studies ordered. Warm/ice compresses recommended. Return instructions given. Pt verbalized understanding.   -     XR FOOT LEFT (MIN 3 VIEWS); Future  -     naproxen (NAPROSYN) 250 MG tablet; Take 1 tablet by mouth 2 times daily (with meals) for 14 days, Disp-28 tablet, R-0Normal  2. Sprain of costal cartilage, initial encounter: Improving, unable to reproduce. NSAIDs recommended.   -     naproxen (NAPROSYN) 250 MG tablet; Take 1 tablet by mouth 2 times daily (with meals) for 14 days, Disp-28 tablet, R-0Normal       Benefits, risks, possible drug interactions, and side effects of all new medications were reviewed with the patient. Pt verbalized understanding.      An electronic signature was used to authenticate this note.  Renetta Winter, APRN - CNP  2/2/2024      Follow-up and Dispositions    Return if symptoms worsen or fail to improve.          History of Present Illness   Chief Complaint     Julio Lee is a 48 y.o. female here for had concerns including Foot Pain (L great toe pain /).   Mrs. Lee presents today with reports pain to proximal great toe joint, onset 4 months ago. Pain is sharp and intermittent, lasts for 1-2 minutes, resolves with movement or massage. Walking for long periods triggers pain. 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 Denies recent trauma or injuries to area.   Pt also reports pain to L rib area that started while she was reaching out in her pantry, onset 2 weeks ago. Pain has improved and became less severe. She has massaged area. Denies bruising, edema or redness to area.     Review of Systems  Review of Systems   Constitutional: Negative.  Negative for chills, fatigue, fever and unexpected weight change.   HENT: Negative.  Negative for congestion, rhinorrhea, sinus pressure and sinus pain.    Eyes: Negative.  Negative for pain, redness and visual

## 2024-03-01 ENCOUNTER — OFFICE VISIT (OUTPATIENT)
Age: 49
End: 2024-03-01
Payer: MEDICAID

## 2024-03-01 VITALS
HEIGHT: 67 IN | HEART RATE: 77 BPM | WEIGHT: 165 LBS | RESPIRATION RATE: 18 BRPM | DIASTOLIC BLOOD PRESSURE: 76 MMHG | SYSTOLIC BLOOD PRESSURE: 124 MMHG | OXYGEN SATURATION: 98 % | TEMPERATURE: 98 F | BODY MASS INDEX: 25.9 KG/M2

## 2024-03-01 DIAGNOSIS — K22.2 ESOPHAGEAL STRICTURE: ICD-10-CM

## 2024-03-01 DIAGNOSIS — K21.9 GASTROESOPHAGEAL REFLUX DISEASE WITHOUT ESOPHAGITIS: Primary | ICD-10-CM

## 2024-03-01 PROCEDURE — 99214 OFFICE O/P EST MOD 30 MIN: CPT | Performed by: NURSE PRACTITIONER

## 2024-03-01 RX ORDER — PANTOPRAZOLE SODIUM 40 MG/1
40 TABLET, DELAYED RELEASE ORAL
Qty: 90 TABLET | Refills: 1 | Status: SHIPPED | OUTPATIENT
Start: 2024-03-01

## 2024-03-01 RX ORDER — LANOLIN ALCOHOL/MO/W.PET/CERES
400 CREAM (GRAM) TOPICAL DAILY
COMMUNITY

## 2024-03-01 ASSESSMENT — ENCOUNTER SYMPTOMS
EYE PAIN: 0
DIARRHEA: 0
COUGH: 0
SINUS PRESSURE: 0
ABDOMINAL PAIN: 1
CONSTIPATION: 0
SHORTNESS OF BREATH: 0
BACK PAIN: 0
SINUS PAIN: 0
CHEST TIGHTNESS: 0
RHINORRHEA: 0
VOMITING: 0
EYES NEGATIVE: 1
BLOOD IN STOOL: 0
EYE REDNESS: 0
NAUSEA: 1
RESPIRATORY NEGATIVE: 1

## 2024-03-01 ASSESSMENT — PATIENT HEALTH QUESTIONNAIRE - PHQ9
SUM OF ALL RESPONSES TO PHQ QUESTIONS 1-9: 0
1. LITTLE INTEREST OR PLEASURE IN DOING THINGS: 0
SUM OF ALL RESPONSES TO PHQ9 QUESTIONS 1 & 2: 0
SUM OF ALL RESPONSES TO PHQ QUESTIONS 1-9: 0
SUM OF ALL RESPONSES TO PHQ QUESTIONS 1-9: 0
2. FEELING DOWN, DEPRESSED OR HOPELESS: 0
SUM OF ALL RESPONSES TO PHQ QUESTIONS 1-9: 0

## 2024-03-01 NOTE — PROGRESS NOTES
Esophageal stricture    Palpitations     Past Surgical History:   Procedure Laterality Date     SECTION  2015    COLONOSCOPY  2020    normal. Dr. Wade (in chart)    IR ESOPHAGOSCOPY W/BALLOON  2021    LAPAROSCOPY      OVARIAN CYST REMOVAL      has one remaining ovary    UPPER GASTROINTESTINAL ENDOSCOPY  2020    mild distal esophagitis.  Dr. Wade.  moderate eosphinphils. tx 8 weeks PPI.  neg. H pylori    UPPER GASTROINTESTINAL ENDOSCOPY  2021    esophageal dilation.  Dr. aWde      Social History     Tobacco Use    Smoking status: Never    Smokeless tobacco: Never   Substance Use Topics    Alcohol use: Never      Family History   Problem Relation Age of Onset    Pulmonary Embolism Mother     High Cholesterol Mother     Thyroid Disease Mother     Hypertension Mother     Asthma Mother     Diabetes Mother     Heart Attack Maternal Grandmother     Schizophrenia Maternal Aunt     Colon Cancer Maternal Uncle 45        maternal great uncle (mom's uncle).  He did not return for FU/tx        Physical Exam   Vitals:       /76 (Site: Left Upper Arm, Position: Sitting, Cuff Size: Large Adult)   Pulse 77   Temp 98 °F (36.7 °C) (Temporal)   Resp 18   Ht 1.702 m (5' 7\")   Wt 74.8 kg (165 lb)   SpO2 98%   BMI 25.84 kg/m²      Physical Exam  Vitals reviewed.   Constitutional:       General: She is not in acute distress.     Appearance: Normal appearance. She is not ill-appearing.   Cardiovascular:      Rate and Rhythm: Normal rate and regular rhythm.      Pulses: Normal pulses.      Heart sounds: Normal heart sounds.   Pulmonary:      Effort: Pulmonary effort is normal.      Breath sounds: Normal breath sounds.   Abdominal:      General: Bowel sounds are normal. There is no distension.      Palpations: Abdomen is soft. There is no mass.      Tenderness: There is abdominal tenderness (epigastric pain). There is no right CVA tenderness, left CVA tenderness, guarding or rebound.

## 2024-03-09 LAB — UREA BREATH TEST QL: NEGATIVE

## 2024-03-22 LAB — UREA BREATH TEST QL: NEGATIVE

## 2024-04-18 DIAGNOSIS — F41.9 ANXIETY: ICD-10-CM

## 2024-04-19 RX ORDER — BUSPIRONE HYDROCHLORIDE 15 MG/1
15 TABLET ORAL 2 TIMES DAILY
Qty: 180 TABLET | Refills: 1 | Status: SHIPPED | OUTPATIENT
Start: 2024-04-19

## 2024-06-29 DIAGNOSIS — F41.9 ANXIETY DISORDER, UNSPECIFIED: ICD-10-CM

## 2024-09-23 ENCOUNTER — OFFICE VISIT (OUTPATIENT)
Age: 49
End: 2024-09-23
Payer: COMMERCIAL

## 2024-09-23 VITALS
OXYGEN SATURATION: 99 % | HEIGHT: 67 IN | TEMPERATURE: 100.9 F | HEART RATE: 88 BPM | RESPIRATION RATE: 16 BRPM | BODY MASS INDEX: 25.58 KG/M2 | DIASTOLIC BLOOD PRESSURE: 70 MMHG | SYSTOLIC BLOOD PRESSURE: 112 MMHG | WEIGHT: 163 LBS

## 2024-09-23 DIAGNOSIS — U07.1 COVID-19: Primary | ICD-10-CM

## 2024-09-23 LAB
GROUP A STREP ANTIGEN, POC: NEGATIVE
INFLUENZA A ANTIGEN, POC: NEGATIVE
INFLUENZA B ANTIGEN, POC: NEGATIVE
LOT EXPIRE DATE: ABNORMAL
LOT KIT NUMBER: ABNORMAL
SARS-COV-2, POC: DETECTED
VALID INTERNAL CONTROL, POC: YES
VALID INTERNAL CONTROL, POC: YES
VALID INTERNAL CONTROL: YES
VENDOR AND KIT NAME POC: ABNORMAL

## 2024-09-23 PROCEDURE — 87426 SARSCOV CORONAVIRUS AG IA: CPT | Performed by: NURSE PRACTITIONER

## 2024-09-23 PROCEDURE — 87804 INFLUENZA ASSAY W/OPTIC: CPT | Performed by: NURSE PRACTITIONER

## 2024-09-23 PROCEDURE — 87880 STREP A ASSAY W/OPTIC: CPT | Performed by: NURSE PRACTITIONER

## 2024-09-23 PROCEDURE — 99213 OFFICE O/P EST LOW 20 MIN: CPT | Performed by: NURSE PRACTITIONER

## 2024-09-23 RX ORDER — BENZONATATE 200 MG/1
200 CAPSULE ORAL 3 TIMES DAILY PRN
Qty: 21 CAPSULE | Refills: 0 | Status: SHIPPED | OUTPATIENT
Start: 2024-09-23 | End: 2024-09-30

## 2024-09-23 RX ORDER — CETIRIZINE HYDROCHLORIDE 10 MG/1
10 TABLET ORAL DAILY
Qty: 30 TABLET | Refills: 0 | Status: SHIPPED | OUTPATIENT
Start: 2024-09-23 | End: 2024-10-23

## 2024-09-23 RX ORDER — FLUTICASONE PROPIONATE 50 MCG
2 SPRAY, SUSPENSION (ML) NASAL DAILY
Qty: 48 G | Refills: 1 | Status: SHIPPED | OUTPATIENT
Start: 2024-09-23

## 2024-09-23 ASSESSMENT — ENCOUNTER SYMPTOMS
SHORTNESS OF BREATH: 0
VOMITING: 0
EYES NEGATIVE: 1
DIARRHEA: 0
EYE REDNESS: 0
SORE THROAT: 1
BACK PAIN: 0
NAUSEA: 0
RHINORRHEA: 1
CHEST TIGHTNESS: 0
SINUS PAIN: 0
COUGH: 1
CONSTIPATION: 0
SINUS PRESSURE: 0
BLOOD IN STOOL: 0
ABDOMINAL PAIN: 0
GASTROINTESTINAL NEGATIVE: 1
EYE PAIN: 0

## 2024-10-16 DIAGNOSIS — U07.1 COVID-19: ICD-10-CM

## 2024-10-16 RX ORDER — CETIRIZINE HYDROCHLORIDE 10 MG/1
10 TABLET ORAL DAILY
Qty: 90 TABLET | Refills: 1 | Status: SHIPPED | OUTPATIENT
Start: 2024-10-16

## 2024-10-25 DIAGNOSIS — F41.9 ANXIETY: ICD-10-CM

## 2024-10-26 RX ORDER — BUSPIRONE HYDROCHLORIDE 15 MG/1
15 TABLET ORAL 2 TIMES DAILY
Qty: 60 TABLET | Refills: 11 | Status: SHIPPED | OUTPATIENT
Start: 2024-10-26

## 2024-12-30 DIAGNOSIS — F41.9 ANXIETY: ICD-10-CM

## 2024-12-30 RX ORDER — BUSPIRONE HYDROCHLORIDE 15 MG/1
15 TABLET ORAL 2 TIMES DAILY
Qty: 180 TABLET | Refills: 4 | OUTPATIENT
Start: 2024-12-30

## 2025-01-31 DIAGNOSIS — F41.9 ANXIETY: ICD-10-CM

## 2025-01-31 RX ORDER — BUSPIRONE HYDROCHLORIDE 15 MG/1
15 TABLET ORAL 2 TIMES DAILY
Qty: 180 TABLET | Refills: 1 | Status: SHIPPED | OUTPATIENT
Start: 2025-01-31

## 2025-02-07 ENCOUNTER — HOSPITAL ENCOUNTER (EMERGENCY)
Facility: HOSPITAL | Age: 50
Discharge: HOME | End: 2025-02-07
Attending: EMERGENCY MEDICINE
Payer: COMMERCIAL

## 2025-02-07 ENCOUNTER — APPOINTMENT (OUTPATIENT)
Facility: HOSPITAL | Age: 50
End: 2025-02-07
Payer: COMMERCIAL

## 2025-02-07 VITALS
BODY MASS INDEX: 26.92 KG/M2 | HEART RATE: 64 BPM | DIASTOLIC BLOOD PRESSURE: 72 MMHG | OXYGEN SATURATION: 98 % | RESPIRATION RATE: 15 BRPM | TEMPERATURE: 97.1 F | WEIGHT: 171.52 LBS | SYSTOLIC BLOOD PRESSURE: 115 MMHG | HEIGHT: 67 IN

## 2025-02-07 DIAGNOSIS — R07.9 CHEST PAIN, UNSPECIFIED TYPE: Primary | ICD-10-CM

## 2025-02-07 LAB
ALBUMIN SERPL-MCNC: 3.8 G/DL (ref 3.5–5)
ALBUMIN/GLOB SERPL: 1 (ref 1.1–2.2)
ALP SERPL-CCNC: 92 U/L (ref 45–117)
ALT SERPL-CCNC: 23 U/L (ref 12–78)
ANION GAP SERPL CALC-SCNC: 7 MMOL/L (ref 2–12)
AST SERPL-CCNC: 20 U/L (ref 15–37)
BASOPHILS # BLD: 0.04 K/UL (ref 0–0.1)
BASOPHILS NFR BLD: 1 % (ref 0–1)
BILIRUB SERPL-MCNC: 0.3 MG/DL (ref 0.2–1)
BUN SERPL-MCNC: 17 MG/DL (ref 6–20)
BUN/CREAT SERPL: 19 (ref 12–20)
CALCIUM SERPL-MCNC: 9.4 MG/DL (ref 8.5–10.1)
CHLORIDE SERPL-SCNC: 105 MMOL/L (ref 97–108)
CO2 SERPL-SCNC: 28 MMOL/L (ref 21–32)
CREAT SERPL-MCNC: 0.91 MG/DL (ref 0.55–1.02)
D DIMER PPP FEU-MCNC: 0.38 MG/L FEU (ref 0–0.65)
DIFFERENTIAL METHOD BLD: ABNORMAL
EKG ATRIAL RATE: 69 BPM
EKG DIAGNOSIS: NORMAL
EKG P AXIS: 63 DEGREES
EKG P-R INTERVAL: 196 MS
EKG Q-T INTERVAL: 376 MS
EKG QRS DURATION: 72 MS
EKG QTC CALCULATION (BAZETT): 402 MS
EKG R AXIS: 21 DEGREES
EKG T AXIS: 42 DEGREES
EKG VENTRICULAR RATE: 69 BPM
EOSINOPHIL # BLD: 0.06 K/UL (ref 0–0.4)
EOSINOPHIL NFR BLD: 1.6 % (ref 0–7)
ERYTHROCYTE [DISTWIDTH] IN BLOOD BY AUTOMATED COUNT: 13.4 % (ref 11.5–14.5)
GLOBULIN SER CALC-MCNC: 3.7 G/DL (ref 2–4)
GLUCOSE SERPL-MCNC: 75 MG/DL (ref 65–100)
HCG UR QL: NEGATIVE
HCT VFR BLD AUTO: 38.9 % (ref 35–47)
HGB BLD-MCNC: 12.6 G/DL (ref 11.5–16)
IMM GRANULOCYTES # BLD AUTO: 0.01 K/UL (ref 0–0.04)
IMM GRANULOCYTES NFR BLD AUTO: 0.3 % (ref 0–0.5)
LYMPHOCYTES # BLD: 1.7 K/UL (ref 0.8–3.5)
LYMPHOCYTES NFR BLD: 44.3 % (ref 12–49)
MCH RBC QN AUTO: 28.8 PG (ref 26–34)
MCHC RBC AUTO-ENTMCNC: 32.4 G/DL (ref 30–36.5)
MCV RBC AUTO: 88.8 FL (ref 80–99)
MONOCYTES # BLD: 0.41 K/UL (ref 0–1)
MONOCYTES NFR BLD: 10.7 % (ref 5–13)
NEUTS SEG # BLD: 1.62 K/UL (ref 1.8–8)
NEUTS SEG NFR BLD: 42.1 % (ref 32–75)
NRBC # BLD: 0 K/UL (ref 0–0.01)
NRBC BLD-RTO: 0 PER 100 WBC
PLATELET # BLD AUTO: 271 K/UL (ref 150–400)
PMV BLD AUTO: 10.6 FL (ref 8.9–12.9)
POTASSIUM SERPL-SCNC: 4.7 MMOL/L (ref 3.5–5.1)
PROT SERPL-MCNC: 7.5 G/DL (ref 6.4–8.2)
RBC # BLD AUTO: 4.38 M/UL (ref 3.8–5.2)
SODIUM SERPL-SCNC: 140 MMOL/L (ref 136–145)
TROPONIN I SERPL HS-MCNC: <4 NG/L (ref 0–51)
WBC # BLD AUTO: 3.8 K/UL (ref 3.6–11)

## 2025-02-07 PROCEDURE — 85379 FIBRIN DEGRADATION QUANT: CPT

## 2025-02-07 PROCEDURE — 36415 COLL VENOUS BLD VENIPUNCTURE: CPT

## 2025-02-07 PROCEDURE — 81025 URINE PREGNANCY TEST: CPT

## 2025-02-07 PROCEDURE — 99285 EMERGENCY DEPT VISIT HI MDM: CPT

## 2025-02-07 PROCEDURE — 85025 COMPLETE CBC W/AUTO DIFF WBC: CPT

## 2025-02-07 PROCEDURE — 80053 COMPREHEN METABOLIC PANEL: CPT

## 2025-02-07 PROCEDURE — 93005 ELECTROCARDIOGRAM TRACING: CPT | Performed by: EMERGENCY MEDICINE

## 2025-02-07 PROCEDURE — 84484 ASSAY OF TROPONIN QUANT: CPT

## 2025-02-07 PROCEDURE — 96374 THER/PROPH/DIAG INJ IV PUSH: CPT

## 2025-02-07 PROCEDURE — 71046 X-RAY EXAM CHEST 2 VIEWS: CPT

## 2025-02-07 PROCEDURE — 6360000002 HC RX W HCPCS: Performed by: EMERGENCY MEDICINE

## 2025-02-07 PROCEDURE — 93010 ELECTROCARDIOGRAM REPORT: CPT | Performed by: SPECIALIST

## 2025-02-07 RX ORDER — KETOROLAC TROMETHAMINE 30 MG/ML
15 INJECTION, SOLUTION INTRAMUSCULAR; INTRAVENOUS ONCE
Status: COMPLETED | OUTPATIENT
Start: 2025-02-07 | End: 2025-02-07

## 2025-02-07 RX ADMIN — KETOROLAC TROMETHAMINE 15 MG: 30 INJECTION, SOLUTION INTRAMUSCULAR; INTRAVENOUS at 12:58

## 2025-02-07 ASSESSMENT — PAIN SCALES - GENERAL: PAINLEVEL_OUTOF10: 4

## 2025-02-07 ASSESSMENT — LIFESTYLE VARIABLES
HOW MANY STANDARD DRINKS CONTAINING ALCOHOL DO YOU HAVE ON A TYPICAL DAY: PATIENT DOES NOT DRINK
HOW OFTEN DO YOU HAVE A DRINK CONTAINING ALCOHOL: NEVER

## 2025-02-07 ASSESSMENT — PAIN DESCRIPTION - DESCRIPTORS: DESCRIPTORS: SHARP

## 2025-02-07 ASSESSMENT — PAIN DESCRIPTION - LOCATION: LOCATION: CHEST

## 2025-02-07 ASSESSMENT — PAIN DESCRIPTION - ORIENTATION: ORIENTATION: LEFT

## 2025-02-07 NOTE — ED PROVIDER NOTES
[facility] EMERGENCY DEPARTMENT  EMERGENCY DEPARTMENT ENCOUNTER      Pt Name: Julio Lee  MRN: [id number]  Birthdate 1975  Date of evaluation: 2/7/2025  Provider: Francis Chavez MD    CHIEF COMPLAINT       Chief Complaint   Patient presents with    Chest Pain         HISTORY OF PRESENT ILLNESS   (Location/Symptom, Timing/Onset, Context/Setting, Quality, Duration, Modifying Factors, Severity)  Note limiting factors.   49-year-old with a history of anxiety, depression, palpitations.  She presents with a 1 day history of chest pain across her chest.  It radiates to her left upper back.  It has been intermittent.  It is worse with movement and inspiration.  She is also felt somewhat dizzy, fatigued, and short of breath.  Her left arm has had intermittent tingling.  She denies a history of hypertension, diabetes, hyperlipidemia, cigarette smoking.  Her mom has a history of PE.  No exertional component to her symptoms.          Review of External Medical Records:     Nursing Notes were reviewed.    REVIEW OF SYSTEMS    (2-9 systems for level 4, 10 or more for level 5)     Review of Systems    Except as noted above the remainder of the review of systems was reviewed and negative.       PAST MEDICAL HISTORY     Past Medical History:   Diagnosis Date    Anxiety 2019    Dr. Simon.  started zoloft 4/2020, buspar 5/2020..  consulted MD in [place] and took xanax prn    Depression 2020    Dr. Simon    Early menopause 03/2019    age 43. dx in [facility]    Elevated liver enzymes 2019    resolved.  consulted GI 2019. US normal    Esophageal stricture     s/p eophageal dilation 2/2021 Dr. Wade    H. pylori infection 2020    dx by stool test.  Dr. Bryant.  failed prevpak, then did not tolerate Pylera.     HSV (herpes simplex virus) infection     dx with hsv-1, hsv-2    Hx of mammogram 08/04/2023    BI-RADS 1: Negative. No mammographic evidence of malignancy.    Keratosis pilaris 2021    legs    Palpitations 2019

## 2025-02-07 NOTE — ED TRIAGE NOTES
Patient arrives with c/o chest pain that radiates to her left arm with tingling since this morning. Patient also reports dizziness, SOB and fatigue. Denies nausea, vomiting or headache.

## 2025-03-31 ENCOUNTER — OFFICE VISIT (OUTPATIENT)
Facility: CLINIC | Age: 50
End: 2025-03-31
Payer: COMMERCIAL

## 2025-03-31 VITALS
OXYGEN SATURATION: 97 % | BODY MASS INDEX: 26.53 KG/M2 | DIASTOLIC BLOOD PRESSURE: 75 MMHG | HEIGHT: 67 IN | WEIGHT: 169 LBS | TEMPERATURE: 97.9 F | SYSTOLIC BLOOD PRESSURE: 112 MMHG | HEART RATE: 69 BPM

## 2025-03-31 DIAGNOSIS — R79.9 ABNORMAL FINDING OF BLOOD CHEMISTRY: ICD-10-CM

## 2025-03-31 DIAGNOSIS — R73.9 BLOOD GLUCOSE ELEVATED: ICD-10-CM

## 2025-03-31 DIAGNOSIS — F41.9 ANXIETY: ICD-10-CM

## 2025-03-31 DIAGNOSIS — L68.0 HIRSUTISM: ICD-10-CM

## 2025-03-31 DIAGNOSIS — R00.2 PALPITATIONS: Primary | ICD-10-CM

## 2025-03-31 DIAGNOSIS — L70.8 OTHER ACNE: ICD-10-CM

## 2025-03-31 DIAGNOSIS — R10.13 DYSPEPSIA: ICD-10-CM

## 2025-03-31 PROBLEM — R74.8 ELEVATED LIVER ENZYMES: Status: RESOLVED | Noted: 2019-01-01 | Resolved: 2025-03-31

## 2025-03-31 PROBLEM — A04.8 H. PYLORI INFECTION: Status: RESOLVED | Noted: 2020-01-01 | Resolved: 2025-03-31

## 2025-03-31 PROCEDURE — 99214 OFFICE O/P EST MOD 30 MIN: CPT | Performed by: INTERNAL MEDICINE

## 2025-03-31 SDOH — ECONOMIC STABILITY: FOOD INSECURITY: WITHIN THE PAST 12 MONTHS, THE FOOD YOU BOUGHT JUST DIDN'T LAST AND YOU DIDN'T HAVE MONEY TO GET MORE.: PATIENT DECLINED

## 2025-03-31 SDOH — ECONOMIC STABILITY: FOOD INSECURITY: WITHIN THE PAST 12 MONTHS, YOU WORRIED THAT YOUR FOOD WOULD RUN OUT BEFORE YOU GOT MONEY TO BUY MORE.: PATIENT DECLINED

## 2025-03-31 ASSESSMENT — PATIENT HEALTH QUESTIONNAIRE - PHQ9
8. MOVING OR SPEAKING SO SLOWLY THAT OTHER PEOPLE COULD HAVE NOTICED. OR THE OPPOSITE, BEING SO FIGETY OR RESTLESS THAT YOU HAVE BEEN MOVING AROUND A LOT MORE THAN USUAL: NOT AT ALL
7. TROUBLE CONCENTRATING ON THINGS, SUCH AS READING THE NEWSPAPER OR WATCHING TELEVISION: NOT AT ALL
SUM OF ALL RESPONSES TO PHQ QUESTIONS 1-9: 4
3. TROUBLE FALLING OR STAYING ASLEEP: NOT AT ALL
2. FEELING DOWN, DEPRESSED OR HOPELESS: NEARLY EVERY DAY
1. LITTLE INTEREST OR PLEASURE IN DOING THINGS: NOT AT ALL
SUM OF ALL RESPONSES TO PHQ QUESTIONS 1-9: 4
9. THOUGHTS THAT YOU WOULD BE BETTER OFF DEAD, OR OF HURTING YOURSELF: NOT AT ALL
6. FEELING BAD ABOUT YOURSELF - OR THAT YOU ARE A FAILURE OR HAVE LET YOURSELF OR YOUR FAMILY DOWN: SEVERAL DAYS
5. POOR APPETITE OR OVEREATING: NOT AT ALL
10. IF YOU CHECKED OFF ANY PROBLEMS, HOW DIFFICULT HAVE THESE PROBLEMS MADE IT FOR YOU TO DO YOUR WORK, TAKE CARE OF THINGS AT HOME, OR GET ALONG WITH OTHER PEOPLE: NOT DIFFICULT AT ALL
SUM OF ALL RESPONSES TO PHQ QUESTIONS 1-9: 4
4. FEELING TIRED OR HAVING LITTLE ENERGY: NOT AT ALL
SUM OF ALL RESPONSES TO PHQ QUESTIONS 1-9: 4

## 2025-03-31 NOTE — PROGRESS NOTES
Identified pt with two pt identifiers(name and ). Reviewed record in preparation for visit and have obtained necessary documentation. All patient medications has been reviewed.  Chief Complaint   Patient presents with    Lab       Health Maintenance Due   Topic    DTaP/Tdap/Td vaccine (1 - Tdap)    Flu vaccine (1)    COVID-19 Vaccine ( season)    Depression Screen      Health Maintenance Review: Patient reminded of \"due or due soon\" health maintenance. I have asked the patient to contact his/her primary care provider (PCP) for follow-up on his/her health maintenance.    Wt Readings from Last 3 Encounters:   25 76.7 kg (169 lb)   25 77.8 kg (171 lb 8.3 oz)   24 73.9 kg (163 lb)     Temp Readings from Last 3 Encounters:   25 97.9 °F (36.6 °C)   25 97.1 °F (36.2 °C) (Tympanic)   24 (!) 100.9 °F (38.3 °C) (Oral)     BP Readings from Last 3 Encounters:   25 112/75   25 115/72   24 112/70     Pulse Readings from Last 3 Encounters:   25 69   25 64   24 88       1. \"Have you been to the ER, urgent care clinic since your last visit?  Hospitalized since your last visit?\"  ER on 25 for chest pain    2. \"Have you seen or consulted any other health care providers outside of the [facility] since your last visit?\" No     3. For patients aged 45-75: Has the patient had a colonoscopy / FIT/ Cologuard? Yes - Care Gap present. Most recent result on file    If the patient is female:    4. For patients aged 40-74: Has the patient had a mammogram within the past 2 years? Yes - Care Gap present. Most recent result on file    5. For patients aged 21-65: Has the patient had a pap smear? Yes - Care Gap present. Most recent result on file    Patient is accompanied by self I have received verbal consent from Julio Lee to discuss any/all medical information while they are present in the room.   
has a past surgical history that includes Upper gastrointestinal endoscopy (2020); Colonoscopy (2020); ovarian cyst removal;  section (); Upper gastrointestinal endoscopy (2021); ir esophagoscopy w/balloon (2021); and laparoscopy.     reports that she has never smoked. She has never used smokeless tobacco. She reports that she does not drink alcohol and does not use drugs.    family history includes Asthma in her mother; Colon Cancer (age of onset: 45) in her maternal uncle; Diabetes in her mother; Heart Attack in her maternal grandmother; High Cholesterol in her mother; Hypertension in her mother; Pulmonary Embolism in her mother; Schizophrenia in her maternal aunt; Thyroid Disease in her mother.    Physical Exam   Nursing note and vitals reviewed.  Blood pressure 112/75, pulse 69, temperature 97.9 °F (36.6 °C), height 1.702 m (5' 7\"), weight 76.7 kg (169 lb), SpO2 97%.  Physical Exam      Constitutional:  No distress.    Eyes: Conjunctivae are normal.   Ears:  Hearing grossly intact  Cardiovascular: Normal rate. regular rhythm, no murmurs or gallops  No edema  Pulmonary/Chest: Effort normal.   CTAB  Musculoskeletal: moves all 4 extremities   Neurological: Alert and oriented to person, place, and time.   Skin: Photos on phone show significant hyperpigmented acne on her face and neck.  She is able to hide it well with make-up with some mildly hypopigmented regions visible to me she has a couple of acute erythematous lesions of her left eyelid and right lateral face  Psychiatric: Normal mood and affect. Behavior is normal.     Assessment and Plan    Assessment & Plan  1. Health Maintenance.  A TSH test, cholesterol test, and A1c test will be conducted to monitor overall health.  Recent blood sugar was 75, indicating no prediabetes.  Gynecology visit was recent and results were normal.  Urinalysis will be conducted.    2. Anxiety.  Reports experiencing anxiety, particularly related to

## 2025-04-01 LAB
APPEARANCE UR: CLEAR
BACTERIA #/AREA URNS HPF: NORMAL /[HPF]
BILIRUB UR QL STRIP: NEGATIVE
CASTS URNS QL MICRO: NORMAL /LPF
CHOLEST SERPL-MCNC: 266 MG/DL (ref 100–199)
COLOR UR: YELLOW
EPI CELLS #/AREA URNS HPF: NORMAL /HPF (ref 0–10)
GLUCOSE UR QL STRIP: NEGATIVE
HBA1C MFR BLD: 6.3 % (ref 4.8–5.6)
HDLC SERPL-MCNC: 45 MG/DL
HGB UR QL STRIP: NEGATIVE
IMP & REVIEW OF LAB RESULTS: NORMAL
KETONES UR QL STRIP: ABNORMAL
LDLC SERPL CALC-MCNC: 203 MG/DL (ref 0–99)
LEUKOCYTE ESTERASE UR QL STRIP: ABNORMAL
Lab: ABNORMAL
MICRO URNS: ABNORMAL
NITRITE UR QL STRIP: NEGATIVE
PH UR STRIP: 6.5 [PH] (ref 5–7.5)
PROT UR QL STRIP: NEGATIVE
RBC #/AREA URNS HPF: NORMAL /HPF (ref 0–2)
SP GR UR STRIP: 1.03 (ref 1–1.03)
SPECIMEN STATUS REPORT: NORMAL
TRIGL SERPL-MCNC: 102 MG/DL (ref 0–149)
TSH SERPL DL<=0.005 MIU/L-ACNC: 3.35 UIU/ML (ref 0.45–4.5)
UROBILINOGEN UR STRIP-MCNC: 1 MG/DL (ref 0.2–1)
VLDLC SERPL CALC-MCNC: 18 MG/DL (ref 5–40)
WBC #/AREA URNS HPF: NORMAL /HPF (ref 0–5)

## 2025-04-02 LAB — DHEA-S SERPL-MCNC: 128 UG/DL (ref 41.2–243.7)

## 2025-04-03 LAB
TESTOST FREE SERPL-MCNC: 1.3 PG/ML (ref 0–4.2)
TESTOST SERPL-MCNC: 22 NG/DL (ref 4–50)

## 2025-04-06 ENCOUNTER — RESULTS FOLLOW-UP (OUTPATIENT)
Facility: CLINIC | Age: 50
End: 2025-04-06

## 2025-04-06 DIAGNOSIS — E78.00 PURE HYPERCHOLESTEROLEMIA: ICD-10-CM

## 2025-04-06 DIAGNOSIS — R73.01 IFG (IMPAIRED FASTING GLUCOSE): Primary | ICD-10-CM

## 2025-04-16 DIAGNOSIS — F41.9 ANXIETY DISORDER, UNSPECIFIED: ICD-10-CM

## 2025-06-28 DIAGNOSIS — F41.9 ANXIETY: ICD-10-CM

## 2025-06-30 RX ORDER — BUSPIRONE HYDROCHLORIDE 15 MG/1
15 TABLET ORAL 2 TIMES DAILY
Qty: 180 TABLET | Refills: 2 | Status: SHIPPED | OUTPATIENT
Start: 2025-06-30

## 2025-07-13 RX ORDER — VALACYCLOVIR HYDROCHLORIDE 500 MG/1
500 TABLET, FILM COATED ORAL 2 TIMES DAILY PRN
Qty: 20 TABLET | Refills: 1 | Status: SHIPPED | OUTPATIENT
Start: 2025-07-13